# Patient Record
Sex: FEMALE | Race: WHITE | NOT HISPANIC OR LATINO | Employment: OTHER | URBAN - METROPOLITAN AREA
[De-identification: names, ages, dates, MRNs, and addresses within clinical notes are randomized per-mention and may not be internally consistent; named-entity substitution may affect disease eponyms.]

---

## 2018-11-05 ENCOUNTER — DOCUMENTATION (OUTPATIENT)
Dept: OTHER | Facility: HOSPITAL | Age: 73
End: 2018-11-05

## 2018-11-05 NOTE — PROGRESS NOTES
Dr. Rodriguez requesting to present patient in GI Tumor Board on 11/8/2018. I had to google patient's surgeon's name to see where I could the path slides and call the Radiology Department to ask if patient had images. I found both path slides/images to be at [facility]. I got their fax numbers and sent fax cover requests for path slides and discs. I asked that they Fed Ex the items overnight and I gave them numbers/addresses. AG

## 2018-11-09 ENCOUNTER — LAB REQUISITION (OUTPATIENT)
Dept: LAB | Facility: HOSPITAL | Age: 73
End: 2018-11-09

## 2018-11-09 DIAGNOSIS — D3A.020 BENIGN CARCINOID TUMOR OF APPENDIX: ICD-10-CM

## 2018-11-09 LAB
CYTO UR: NORMAL
LAB AP CASE REPORT: NORMAL
LAB AP DIAGNOSIS COMMENT: NORMAL
PATH REPORT.FINAL DX SPEC: NORMAL
PATH REPORT.GROSS SPEC: NORMAL

## 2018-11-09 PROCEDURE — 88321 CONSLTJ&REPRT SLD PREP ELSWR: CPT | Performed by: COLON & RECTAL SURGERY

## 2018-11-20 ENCOUNTER — CONSULT (OUTPATIENT)
Dept: ONCOLOGY | Facility: CLINIC | Age: 73
End: 2018-11-20

## 2018-11-20 VITALS
DIASTOLIC BLOOD PRESSURE: 55 MMHG | RESPIRATION RATE: 16 BRPM | HEIGHT: 63 IN | OXYGEN SATURATION: 97 % | SYSTOLIC BLOOD PRESSURE: 139 MMHG | BODY MASS INDEX: 25.34 KG/M2 | TEMPERATURE: 97.2 F | WEIGHT: 143 LBS | HEART RATE: 60 BPM

## 2018-11-20 DIAGNOSIS — C7A.00 MALIGNANT CARCINOID TUMOR (HCC): ICD-10-CM

## 2018-11-20 DIAGNOSIS — D37.3 LOW GRADE MUCINOUS NEOPLASM OF APPENDIX: ICD-10-CM

## 2018-11-20 DIAGNOSIS — D3A.020 APPENDICEAL CARCINOID TUMOR: Primary | ICD-10-CM

## 2018-11-20 PROCEDURE — 99205 OFFICE O/P NEW HI 60 MIN: CPT | Performed by: INTERNAL MEDICINE

## 2018-11-20 RX ORDER — LISINOPRIL 2.5 MG/1
2.5 TABLET ORAL DAILY
COMMUNITY

## 2018-11-20 RX ORDER — CLONAZEPAM 0.5 MG/1
0.5 TABLET ORAL 2 TIMES DAILY PRN
Refills: 0 | COMMUNITY
Start: 2018-10-10 | End: 2022-07-14

## 2018-11-20 RX ORDER — CITALOPRAM 40 MG/1
40 TABLET ORAL DAILY
COMMUNITY
End: 2019-06-18 | Stop reason: ALTCHOICE

## 2018-11-20 RX ORDER — ONDANSETRON 4 MG/1
4 TABLET, FILM COATED ORAL AS NEEDED
Refills: 0 | COMMUNITY
Start: 2018-10-25

## 2018-11-20 NOTE — PROGRESS NOTES
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 Rodriguez were reviewed and summarized in detail.    Oncology/Hematology History    1. Low-grade mucinous neoplasm of appendix   2. Appendiceal carcinoid     -10/22/18 CT abdomen pelvis [facility] showed small bowel obstruction in the distal ileum with trace ascites.  Laparoscopic resection of appendix showed 4 cm low grade appendiceal mucinous neoplasm well-differentiated with invasion into the muscularis propria into the subserosa focally extending to the serosal surface making this a pathological T4.  Margins were all negative.  No lymphovascular invasion.  Nodes not sampled.  In the same specimen at the base of the appendix was a 5 mm grade 1 well-differentiated carcinoid with the base of the appendix margin positive.  No perineural or lymph vascular invasion.  Subsequently presented to Dr. Dante Rodriguez and then to our multidisciplinary complex GI conference.  The consensus was to scan her thoroughly preferably with a gallium 68 Dotetate scan if possible and then do a right hemicolectomy and then consider adjuvant therapy based on the pathologic staging at that junction.  I saw for the first time on 11/20/18.  Low-grade mucinous neoplasms of the appendix with the potential of this having perforated does have a high risk of peritoneal spread and adjuvant chemotherapy as of dubious benefit.  Appendiceal carcinoids generally are coincidentally found an rarely spread to the liver.  Nonetheless I would like to get gallium-68 Dotetate PET and we'll check her chromogranin A, serotonin, and 24-hour urine 5-HIAA.  Assuming that's negative then I would proceed with a right hemicolectomy given the positive margin at the base of the appendectomy and I would make adjuvant decisions based on what Dr. Rodriguez  found intraoperatively and on pathologic staging.  I will also would add parenthetically that low-grade mucinous carcinomas are generally chemotherapy insensitive and she may prefer to have her surgery done in a place where were they to stumble upon peritoneal involvement of this process that she could do Hipec.  Her daughter lives in [place] and they have the capability at [facility].  With reference to the carcinoid component of this, generally the 5-FU based adjuvant chemotherapy we would give for colon cancer is not helpful for that either but would usually involve tyrosine kinase inhibitors or Temodar and or Avastin but that is not necessarily in an adjuvant setting.        Low grade mucinous neoplasm of appendix    2018 Initial Diagnosis     Low grade mucinous neoplasm of appendix            HISTORY OF PRESENT ILLNESS:  The patient is a 73 y.o.  female, referred for low-grade mucinous neoplasm of the appendix and appendiceal carcinoid.  See above for her oncology history of present illness    REVIEW OF SYSTEMS:  A 14 point review of systems was performed and is negative except as noted above.    History reviewed. No pertinent past medical history.  Past Surgical History:   Procedure Laterality Date   • APPENDECTOMY     •  SECTION     • OOPHORECTOMY Right        Current Outpatient Medications on File Prior to Visit   Medication Sig Dispense Refill   • citalopram (CeleXA) 40 MG tablet Take 40 mg by mouth Daily.     • clonazePAM (KlonoPIN) 0.5 MG tablet Take 0.5 mg by mouth 2 (Two) Times a Day As Needed.  0   • lisinopril (PRINIVIL,ZESTRIL) 2.5 MG tablet Take 2.5 mg by mouth Daily.     • metoprolol tartrate (LOPRESSOR) 25 MG tablet Take 12.5 mg by mouth 2 (Two) Times a Day.  0   • ondansetron (ZOFRAN) 4 MG tablet Take 4 mg by mouth As Needed.  0     No current facility-administered medications on file prior to visit.        Allergies   Allergen Reactions   • Cefuroxime Rash   • Sulfa Antibiotics  "Rash       Social History     Socioeconomic History   • Marital status: Unknown     Spouse name: Not on file   • Number of children: Not on file   • Years of education: Not on file   • Highest education level: Not on file   Tobacco Use   • Smoking status: Never Smoker   • Smokeless tobacco: Never Used       Family History   Problem Relation Age of Onset   • Hypertension Mother    • Heart attack Father    • Lung cancer Father    • Cancer Father        PHYSICAL EXAM:    /55   Pulse 60   Temp 97.2 °F (36.2 °C)   Resp 16   Ht 158.8 cm (62.5\")   Wt 64.9 kg (143 lb)   SpO2 97%   BMI 25.74 kg/m²     ECOG: (0) Fully active, able to carry on all predisease performance without restriction  General: well appearing female in no acute distress  HEENT: sclera anicteric, oropharynx clear  Lymphatics: no cervical, supraclavicular, inguinal, or axillary adenopathy  Cardiovascular: regular rate and rhythm, no murmurs  Neck: Supple; No thyromegaly  Lungs: clear to auscultation bilaterally. No respiratory distress.   Abdomen: soft, nontender, nondistended.  No palpable organomegaly  Extremities: no cyanosis, clubbing, edema, or cords  Skin: no rashes, lesions, bruising, or petechiae  Neuro: Alert and oriented x 4; Moving all extremities.  Psych: No anxiety or depression      Assessment/Plan     1. Low-grade mucinous neoplasm of appendix   2. Appendiceal carcinoid     -10/22/18 CT abdomen pelvis [facility] showed small bowel obstruction in the distal ileum with trace ascites.  Laparoscopic resection of appendix showed 4 cm low grade appendiceal mucinous neoplasm well-differentiated with invasion into the muscularis propria into the subserosa focally extending to the serosal surface making this a pathological T4.  Margins were all negative.  No lymphovascular invasion.  Nodes not sampled.  In the same specimen at the base of the appendix was a 5 mm grade 1 well-differentiated carcinoid with the base of " the appendix margin positive.  No perineural or lymph vascular invasion.  Subsequently presented to Dr. Dante Rodriguez and then to our multidisciplinary complex GI conference.  The consensus was to scan her thoroughly preferably with a gallium 68 Dotetate scan if possible and then do a right hemicolectomy and then consider adjuvant therapy based on the pathologic staging at that junction.  I saw for the first time on 11/20/18.  Low-grade mucinous neoplasms of the appendix with the potential of this having perforated does have a high risk of peritoneal spread and adjuvant chemotherapy as of dubious benefit.  Appendiceal carcinoids generally are coincidentally found an rarely spread to the liver.  Nonetheless I would like to get gallium-68 Dotetate PET and we'll check her chromogranin A, serotonin, and 24-hour urine 5-HIAA.  Assuming that's negative then I would proceed with a right hemicolectomy given the positive margin at the base of the appendectomy and I would make adjuvant decisions based on what Dr. Rodriguez found intraoperatively and on pathologic staging.  I will also would add parenthetically that low-grade mucinous carcinomas are generally chemotherapy insensitive and she may prefer to have her surgery done in a place where were they to stumble upon peritoneal involvement of this process that she could do Hipec.  Her daughter lives in [place] and they have the capability at [facility].  With reference to the carcinoid component of this, generally the 5-FU based adjuvant chemotherapy we would give for colon cancer is not helpful for that either but would usually involve tyrosine kinase inhibitors or Temodar and or Avastin but that is not necessarily in an adjuvant setting.  Discussed with patient for over an hour greater than 50% spent counseling.    Matheus Bryan MD    11/20/2018

## 2018-11-27 ENCOUNTER — HOSPITAL ENCOUNTER (OUTPATIENT)
Dept: PET IMAGING | Facility: HOSPITAL | Age: 73
Discharge: HOME | End: 2018-11-27
Attending: INTERNAL MEDICINE | Admitting: INTERNAL MEDICINE

## 2018-11-27 ENCOUNTER — HOSPITAL ENCOUNTER (OUTPATIENT)
Dept: PET IMAGING | Facility: HOSPITAL | Age: 73
Discharge: HOME | End: 2018-11-27
Attending: INTERNAL MEDICINE

## 2018-11-27 ENCOUNTER — LAB (OUTPATIENT)
Dept: LAB | Facility: HOSPITAL | Age: 73
End: 2018-11-27

## 2018-11-27 DIAGNOSIS — D3A.020 APPENDICEAL CARCINOID TUMOR: ICD-10-CM

## 2018-11-27 DIAGNOSIS — D3A.020 APPENDICEAL CARCINOID TUMOR: Primary | ICD-10-CM

## 2018-11-27 DIAGNOSIS — D37.3 LOW GRADE MUCINOUS NEOPLASM OF APPENDIX: ICD-10-CM

## 2018-11-27 DIAGNOSIS — C7A.020 MALIGNANT CARCINOID TUMOR OF APPENDIX (HCC): ICD-10-CM

## 2018-11-27 DIAGNOSIS — C7A.00 MALIGNANT CARCINOID TUMOR (HCC): ICD-10-CM

## 2018-11-27 LAB
ALBUMIN SERPL-MCNC: 4.57 G/DL (ref 3.2–4.8)
ALBUMIN/GLOB SERPL: 2 G/DL (ref 1.5–2.5)
ALP SERPL-CCNC: 55 U/L (ref 25–100)
ALT SERPL W P-5'-P-CCNC: 40 U/L (ref 7–40)
ANION GAP SERPL CALCULATED.3IONS-SCNC: 6 MMOL/L (ref 3–11)
AST SERPL-CCNC: 38 U/L (ref 0–33)
BASOPHILS # BLD AUTO: 0.03 10*3/MM3 (ref 0–0.2)
BASOPHILS NFR BLD AUTO: 0.5 % (ref 0–1)
BILIRUB SERPL-MCNC: 1.2 MG/DL (ref 0.3–1.2)
BUN BLD-MCNC: 15 MG/DL (ref 9–23)
BUN/CREAT SERPL: 20 (ref 7–25)
CALCIUM SPEC-SCNC: 9.3 MG/DL (ref 8.7–10.4)
CEA SERPL-MCNC: 1.9 NG/ML (ref 0–2.5)
CHLORIDE SERPL-SCNC: 100 MMOL/L (ref 99–109)
CO2 SERPL-SCNC: 29 MMOL/L (ref 20–31)
CREAT BLD-MCNC: 0.75 MG/DL (ref 0.6–1.3)
DEPRECATED RDW RBC AUTO: 45.3 FL (ref 37–54)
EOSINOPHIL # BLD AUTO: 0.35 10*3/MM3 (ref 0–0.3)
EOSINOPHIL NFR BLD AUTO: 5.3 % (ref 0–3)
ERYTHROCYTE [DISTWIDTH] IN BLOOD BY AUTOMATED COUNT: 13 % (ref 11.3–14.5)
GFR SERPL CREATININE-BSD FRML MDRD: 76 ML/MIN/1.73
GLOBULIN UR ELPH-MCNC: 2.3 GM/DL
GLUCOSE BLD-MCNC: 83 MG/DL (ref 70–100)
GLUCOSE BLDC GLUCOMTR-MCNC: 91 MG/DL (ref 70–130)
HCT VFR BLD AUTO: 41.7 % (ref 34.5–44)
HGB BLD-MCNC: 13.4 G/DL (ref 11.5–15.5)
IMM GRANULOCYTES # BLD: 0.03 10*3/MM3 (ref 0–0.03)
IMM GRANULOCYTES NFR BLD: 0.5 % (ref 0–0.6)
LYMPHOCYTES # BLD AUTO: 2.44 10*3/MM3 (ref 0.6–4.8)
LYMPHOCYTES NFR BLD AUTO: 37.1 % (ref 24–44)
MCH RBC QN AUTO: 30.5 PG (ref 27–31)
MCHC RBC AUTO-ENTMCNC: 32.1 G/DL (ref 32–36)
MCV RBC AUTO: 94.8 FL (ref 80–99)
MONOCYTES # BLD AUTO: 0.44 10*3/MM3 (ref 0–1)
MONOCYTES NFR BLD AUTO: 6.7 % (ref 0–12)
NEUTROPHILS # BLD AUTO: 3.31 10*3/MM3 (ref 1.5–8.3)
NEUTROPHILS NFR BLD AUTO: 50.4 % (ref 41–71)
PLATELET # BLD AUTO: 266 10*3/MM3 (ref 150–450)
PMV BLD AUTO: 11.1 FL (ref 6–12)
POTASSIUM BLD-SCNC: 4.6 MMOL/L (ref 3.5–5.5)
PROT SERPL-MCNC: 6.9 G/DL (ref 5.7–8.2)
RBC # BLD AUTO: 4.4 10*6/MM3 (ref 3.89–5.14)
SODIUM BLD-SCNC: 135 MMOL/L (ref 132–146)
WBC NRBC COR # BLD: 6.57 10*3/MM3 (ref 3.5–10.8)

## 2018-11-27 PROCEDURE — 82962 GLUCOSE BLOOD TEST: CPT

## 2018-11-27 PROCEDURE — A9552 F18 FDG: HCPCS | Performed by: INTERNAL MEDICINE

## 2018-11-27 PROCEDURE — 36415 COLL VENOUS BLD VENIPUNCTURE: CPT

## 2018-11-27 PROCEDURE — 84260 ASSAY OF SEROTONIN: CPT

## 2018-11-27 PROCEDURE — 0 FLUDEOXYGLUCOSE F18 SOLUTION: Performed by: INTERNAL MEDICINE

## 2018-11-27 PROCEDURE — 86316 IMMUNOASSAY TUMOR OTHER: CPT

## 2018-11-27 PROCEDURE — 80053 COMPREHEN METABOLIC PANEL: CPT

## 2018-11-27 PROCEDURE — 83497 ASSAY OF 5-HIAA: CPT

## 2018-11-27 PROCEDURE — 81050 URINALYSIS VOLUME MEASURE: CPT

## 2018-11-27 PROCEDURE — 78815 PET IMAGE W/CT SKULL-THIGH: CPT

## 2018-11-27 PROCEDURE — 82378 CARCINOEMBRYONIC ANTIGEN: CPT

## 2018-11-27 PROCEDURE — 85025 COMPLETE CBC W/AUTO DIFF WBC: CPT

## 2018-11-27 RX ADMIN — FLUDEOXYGLUCOSE F18 1 DOSE: 300 INJECTION INTRAVENOUS at 12:59

## 2018-11-29 ENCOUNTER — TELEPHONE (OUTPATIENT)
Dept: ONCOLOGY | Facility: CLINIC | Age: 73
End: 2018-11-29

## 2018-11-29 LAB — CGA SERPL-SCNC: <1 NMOL/L (ref 0–5)

## 2018-11-29 NOTE — TELEPHONE ENCOUNTER
PET results dicussed and explained why a regular PET doesn't give us the answers we need.  Patient had additional questions regarding the prep for the [employer] PET.  Discussed with Dameon ALDRIDGE  She will call her later today.  Patient notified.

## 2018-11-30 ENCOUNTER — TELEPHONE (OUTPATIENT)
Dept: ONCOLOGY | Facility: CLINIC | Age: 73
End: 2018-11-30

## 2018-11-30 NOTE — TELEPHONE ENCOUNTER
----- Message from Dameon Carlson sent at 11/30/2018  1:41 PM EST -----  Regarding: RE: TERRENCE - MARGARET   Contact: [phone]   Will r/s colonscopy  ----- Message -----  From: Una Escalante RN  Sent: 11/30/2018  11:09 AM  To: Dameon Carlson  Subject: FW: TERRENCE - QUESTIONS                                ----- Message -----  From: Sarina Luque  Sent: 11/30/2018  10:45 AM  To: e Onc [facility] Nurse [place]  Subject: TERRENCE - QUESTIONS                                PATIENT CALLED AND SAID TERRENCE ORDERED PET SCAN ON DEC 4, 2018. DR NATHAN WANTS TO DO A COLONOSCOPY THAT MORNING.     SHE IS WANTING TO KNOW IF THE PREP WILL INTERFERE WITH THE PET SCAN? OR SHOULD SHE RESCHEDULE COLONOSCOPY?

## 2018-12-01 LAB
5OH-INDOLEACETATE 24H UR-MCNC: 2.4 MG/L
5OH-INDOLEACETATE 24H UR-MRATE: 2.5 MG/24 HR (ref 0–14.9)
SEROTONIN PLAS-MCNC: 7 NG/ML (ref 0–420)

## 2018-12-04 ENCOUNTER — HOSPITAL ENCOUNTER (OUTPATIENT)
Dept: PET IMAGING | Facility: HOSPITAL | Age: 73
Discharge: HOME | End: 2018-12-04
Attending: INTERNAL MEDICINE

## 2018-12-04 ENCOUNTER — HOSPITAL ENCOUNTER (OUTPATIENT)
Dept: PET IMAGING | Facility: HOSPITAL | Age: 73
Discharge: HOME | End: 2018-12-04
Attending: INTERNAL MEDICINE | Admitting: INTERNAL MEDICINE

## 2018-12-04 DIAGNOSIS — C7A.020 MALIGNANT CARCINOID TUMOR OF APPENDIX (HCC): ICD-10-CM

## 2018-12-04 DIAGNOSIS — D3A.020 APPENDICEAL CARCINOID TUMOR: ICD-10-CM

## 2018-12-04 PROCEDURE — A9587 GALLIUM GA-68: HCPCS | Performed by: INTERNAL MEDICINE

## 2018-12-04 PROCEDURE — 0 GALLIUM GA 68 DOTATATE KIT: Performed by: INTERNAL MEDICINE

## 2018-12-04 PROCEDURE — 78815 PET IMAGE W/CT SKULL-THIGH: CPT

## 2018-12-04 RX ADMIN — 68GA-DOTATATE 1.06 EACH: KIT INTRAVENOUS at 15:50

## 2018-12-07 ENCOUNTER — OFFICE VISIT (OUTPATIENT)
Dept: ONCOLOGY | Facility: CLINIC | Age: 73
End: 2018-12-07

## 2018-12-07 VITALS
HEIGHT: 63 IN | SYSTOLIC BLOOD PRESSURE: 141 MMHG | TEMPERATURE: 98.4 F | RESPIRATION RATE: 16 BRPM | BODY MASS INDEX: 25.69 KG/M2 | HEART RATE: 59 BPM | WEIGHT: 145 LBS | DIASTOLIC BLOOD PRESSURE: 59 MMHG

## 2018-12-07 DIAGNOSIS — D37.3 LOW GRADE MUCINOUS NEOPLASM OF APPENDIX: Primary | ICD-10-CM

## 2018-12-07 DIAGNOSIS — D3A.020 APPENDICEAL CARCINOID TUMOR: ICD-10-CM

## 2018-12-07 PROCEDURE — 99214 OFFICE O/P EST MOD 30 MIN: CPT | Performed by: INTERNAL MEDICINE

## 2018-12-07 NOTE — PROGRESS NOTES
CHIEF COMPLAINT: Follow-up low-grade mucinous neoplasm of appendix as well as appendiceal carcinoid    Problem List:  Oncology/Hematology History    1. Low-grade mucinous neoplasm of appendix   2. Appendiceal carcinoid     -10/22/18 CT abdomen pelvis [facility] showed small bowel obstruction in the distal ileum with trace ascites.  Laparoscopic resection of appendix showed 4 cm low grade appendiceal mucinous neoplasm well-differentiated with invasion into the muscularis propria into the subserosa focally extending to the serosal surface making this a pathological T4.  Margins were all negative.  No lymphovascular invasion.  Nodes not sampled.  In the same specimen at the base of the appendix was a 5 mm grade 1 well-differentiated carcinoid with the base of the appendix margin positive.  No perineural or lymph vascular invasion.  Subsequently presented to Dr. Dante Rodriguez and then to our multidisciplinary complex GI conference.  The consensus was to scan her thoroughly preferably with a gallium 68 Dotetate scan if possible and then do a right hemicolectomy and then consider adjuvant therapy based on the pathologic staging at that junction.  I saw for the first time on 11/20/18.  Low-grade mucinous neoplasms of the appendix with the potential of this having perforated does have a high risk of peritoneal spread and adjuvant chemotherapy as of dubious benefit.  Appendiceal carcinoids generally are coincidentally found an rarely spread to the liver.  Nonetheless I would like to get gallium-68 Dotetate PET and we'll check her chromogranin A, serotonin, and 24-hour urine 5-HIAA.  Assuming that's negative then I would proceed with a right hemicolectomy given the positive margin at the base of the appendectomy and I would make adjuvant decisions based on what Dr. Rodriguez found intraoperatively and on pathologic staging.  I will also would add parenthetically that low-grade mucinous carcinomas are  generally chemotherapy insensitive and she may prefer to have her surgery done in a place where were they to stumble upon peritoneal involvement of this process that she could do Hipec.  Her daughter lives in [place] and they have the capability at [facility].  With reference to the carcinoid component of this, generally the 5-FU based adjuvant chemotherapy we would give for colon cancer is not helpful for that either but would usually involve tyrosine kinase inhibitors or Temodar and or Avastin but that is not necessarily in an adjuvant setting.  -18 gallium-68 Dotetate as well as FDG PET both performed within the last couple of weeks showing no metastasis.        Low grade mucinous neoplasm of appendix    2018 Initial Diagnosis     Low grade mucinous neoplasm of appendix            HISTORY OF PRESENT ILLNESS:  The patient is a 73 y.o. female, here for follow up on management of low-grade mucinous carcinoma as well as appendiceal carcinoid.  No evidence of metastasis on standard pet or on gallium-68 Dotetate PET.  No serologic hint of metastasis.      History reviewed. No pertinent past medical history.  Past Surgical History:   Procedure Laterality Date   • APPENDECTOMY     •  SECTION     • OOPHORECTOMY Right        Allergies   Allergen Reactions   • Cefuroxime Rash   • Sulfa Antibiotics Rash       Family History and Social History reviewed and changed as necessary      REVIEW OF SYSTEM:   Review of Systems   Constitutional: Negative for appetite change, chills, diaphoresis, fatigue, fever and unexpected weight change.   HENT:   Negative for mouth sores, sore throat and trouble swallowing.    Eyes: Negative for icterus.   Respiratory: Negative for cough, hemoptysis and shortness of breath.    Cardiovascular: Negative for chest pain, leg swelling and palpitations.   Gastrointestinal: Negative for abdominal distention, abdominal pain, blood in stool, constipation, diarrhea, nausea and  "vomiting.   Endocrine: Negative for hot flashes.   Genitourinary: Negative for bladder incontinence, difficulty urinating, dysuria, frequency and hematuria.    Musculoskeletal: Negative for gait problem, neck pain and neck stiffness.   Skin: Negative for rash.   Neurological: Negative for dizziness, gait problem, headaches, light-headedness and numbness.   Hematological: Negative for adenopathy. Does not bruise/bleed easily.   Psychiatric/Behavioral: Negative for depression. The patient is not nervous/anxious.    All other systems reviewed and are negative.       PHYSICAL EXAM    Vitals:    12/07/18 1341   BP: 141/59   Pulse: 59   Resp: 16   Temp: 98.4 °F (36.9 °C)   Weight: 65.8 kg (145 lb)   Height: 158.8 cm (62.5\")     Constitutional: Appears well-developed and well-nourished. No distress.   ECOG: (0) Fully active, able to carry on all predisease performance without restriction  HENT:   Head: Normocephalic.   Mouth/Throat: Oropharynx is clear and moist.   Eyes: Conjunctivae are normal. Pupils are equal, round, and reactive to light. No scleral icterus.   Neck: Neck supple. No JVD present. No thyromegaly present.   Cardiovascular: Normal rate, regular rhythm and normal heart sounds.    Pulmonary/Chest: Breath sounds normal. No respiratory distress.   Abdominal: Soft. Exhibits no distension and no mass. There is no hepatosplenomegaly. There is no tenderness. There is no rebound and no guarding.   Musculoskeletal:Exhibits no edema, tenderness or deformity.   Neurological: Alert and oriented to person, place, and time. Exhibits normal muscle tone.   Skin: No ecchymosis, no petechiae and no rash noted. Not diaphoretic. No cyanosis. Nails show no clubbing.   Psychiatric: Normal mood and affect.   Vitals reviewed.      Nm Pet Skull Base To Mid Thigh    Result Date: 12/5/2018  Negative Gallium Dotatate scan. No evidence of residual or metastatic neuroendocrine tumor. Gallstones incidentally noted.  D:  12/04/2018 E:  " 12/05/2018     This report was finalized on 12/5/2018 9:04 PM by DR. Osorio Chin MD.              ASSESSMENT & PLAN:    1.  Low-grade mucinous carcinoma  2. Appendiceal carcinoid    Discussion: no hint of mesenteric soilage or distant metastasis on gallium-68 Dotetate or on 24-hour urine 5 HIAA or chromogranin A.  Have discussed the case thoroughly with Dr. Rodriguez as well as by phone with Alexander Hunter at the [facility].  In the event that mesenteric soilage has occurred, which I doubt will be the case, I would want her in a situation where she could be treated with Hipec, and Dr. Hunter is intermittently familiar with that.  I will see her back in the new year to see what they have found and what their decisions are.  Discussed with patient 30 minutes greater than 50% spent counseling    Matheus Bryan MD    12/07/2018

## 2019-03-07 ENCOUNTER — LAB (OUTPATIENT)
Dept: LAB | Facility: HOSPITAL | Age: 74
End: 2019-03-07

## 2019-03-07 ENCOUNTER — OFFICE VISIT (OUTPATIENT)
Dept: ONCOLOGY | Facility: CLINIC | Age: 74
End: 2019-03-07

## 2019-03-07 VITALS
HEART RATE: 60 BPM | WEIGHT: 127 LBS | SYSTOLIC BLOOD PRESSURE: 153 MMHG | DIASTOLIC BLOOD PRESSURE: 67 MMHG | RESPIRATION RATE: 16 BRPM | TEMPERATURE: 97.1 F | BODY MASS INDEX: 22.5 KG/M2 | HEIGHT: 63 IN | OXYGEN SATURATION: 98 %

## 2019-03-07 DIAGNOSIS — D37.3 LOW GRADE MUCINOUS NEOPLASM OF APPENDIX: Primary | Chronic | ICD-10-CM

## 2019-03-07 DIAGNOSIS — D3A.020 APPENDICEAL CARCINOID TUMOR: ICD-10-CM

## 2019-03-07 DIAGNOSIS — D37.3 LOW GRADE MUCINOUS NEOPLASM OF APPENDIX: ICD-10-CM

## 2019-03-07 DIAGNOSIS — C7B.00 SECONDARY CARCINOID TUMOR (HCC): ICD-10-CM

## 2019-03-07 LAB
ALBUMIN SERPL-MCNC: 4.76 G/DL (ref 3.2–4.8)
ALBUMIN/GLOB SERPL: 2.1 G/DL (ref 1.5–2.5)
ALP SERPL-CCNC: 63 U/L (ref 25–100)
ALT SERPL W P-5'-P-CCNC: 20 U/L (ref 7–40)
ANION GAP SERPL CALCULATED.3IONS-SCNC: 9 MMOL/L (ref 3–11)
AST SERPL-CCNC: 29 U/L (ref 0–33)
BILIRUB SERPL-MCNC: 1.4 MG/DL (ref 0.3–1.2)
BUN BLD-MCNC: 16 MG/DL (ref 9–23)
BUN/CREAT SERPL: 20.8 (ref 7–25)
CALCIUM SPEC-SCNC: 9.6 MG/DL (ref 8.7–10.4)
CHLORIDE SERPL-SCNC: 101 MMOL/L (ref 99–109)
CO2 SERPL-SCNC: 31 MMOL/L (ref 20–31)
CREAT BLD-MCNC: 0.77 MG/DL (ref 0.6–1.3)
ERYTHROCYTE [DISTWIDTH] IN BLOOD BY AUTOMATED COUNT: 13.2 % (ref 11.3–14.5)
GFR SERPL CREATININE-BSD FRML MDRD: 74 ML/MIN/1.73
GLOBULIN UR ELPH-MCNC: 2.2 GM/DL
GLUCOSE BLD-MCNC: 112 MG/DL (ref 70–100)
HCT VFR BLD AUTO: 36 % (ref 34.5–44)
HGB BLD-MCNC: 12.1 G/DL (ref 11.5–15.5)
LYMPHOCYTES # BLD AUTO: 2.4 10*3/MM3 (ref 0.6–4.8)
LYMPHOCYTES NFR BLD AUTO: 29.4 % (ref 24–44)
MCH RBC QN AUTO: 31 PG (ref 27–31)
MCHC RBC AUTO-ENTMCNC: 33.5 G/DL (ref 32–36)
MCV RBC AUTO: 92.6 FL (ref 80–99)
MONOCYTES # BLD AUTO: 0.4 10*3/MM3 (ref 0–1)
MONOCYTES NFR BLD AUTO: 4.3 % (ref 0–12)
NEUTROPHILS # BLD AUTO: 5.4 10*3/MM3 (ref 1.5–8.3)
NEUTROPHILS NFR BLD AUTO: 66.3 % (ref 41–71)
PLATELET # BLD AUTO: 282 10*3/MM3 (ref 150–450)
PMV BLD AUTO: 8.4 FL (ref 6–12)
POTASSIUM BLD-SCNC: 3.4 MMOL/L (ref 3.5–5.5)
PROT SERPL-MCNC: 7 G/DL (ref 5.7–8.2)
RBC # BLD AUTO: 3.89 10*6/MM3 (ref 3.89–5.14)
SODIUM BLD-SCNC: 141 MMOL/L (ref 132–146)
WBC NRBC COR # BLD: 8.2 10*3/MM3 (ref 3.5–10.8)

## 2019-03-07 PROCEDURE — 84260 ASSAY OF SEROTONIN: CPT

## 2019-03-07 PROCEDURE — 85025 COMPLETE CBC W/AUTO DIFF WBC: CPT

## 2019-03-07 PROCEDURE — 80053 COMPREHEN METABOLIC PANEL: CPT

## 2019-03-07 PROCEDURE — 86316 IMMUNOASSAY TUMOR OTHER: CPT

## 2019-03-07 PROCEDURE — 36415 COLL VENOUS BLD VENIPUNCTURE: CPT

## 2019-03-07 PROCEDURE — 99215 OFFICE O/P EST HI 40 MIN: CPT | Performed by: INTERNAL MEDICINE

## 2019-03-07 NOTE — PROGRESS NOTES
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    Problem List:  Oncology/Hematology History    1. 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 0.5 cm T2 N0 M0 invading the muscularis propria stage I appendiceal goblet cell carcinoid     -10/22/18 CT abdomen pelvis [facility] showed small bowel obstruction in the distal ileum with trace ascites.  Laparoscopic resection of appendix showed 4 cm low grade appendiceal mucinous neoplasm well-differentiated with invasion into the muscularis propria into the subserosa focally extending to the serosal surface making this a pathological T4.  Margins were all negative.  No lymphovascular invasion.  Nodes not sampled.  In the same specimen at the base of the appendix was a 5 mm grade 1 well-differentiated carcinoid with the base of the appendix margin positive.  No perineural or lymph vascular invasion.  Subsequently presented to Dr. Dante Rodriguez and then to our multidisciplinary complex GI conference.  The consensus was to scan her thoroughly preferably with a gallium 68 Dotetate scan if possible and then do a right hemicolectomy and then consider adjuvant therapy based on the pathologic staging at that junction.  I saw for the first time on 11/20/18.  Low-grade mucinous neoplasms of the appendix with the potential of this having perforated does have a high risk of peritoneal spread and adjuvant chemotherapy as of dubious benefit.  Appendiceal carcinoids generally are coincidentally found an rarely spread to the liver.  Nonetheless I would like to get gallium-68 Dotetate PET and we'll check her chromogranin A, serotonin, and 24-hour urine 5-HIAA.  Assuming that's negative then I would proceed with a right hemicolectomy given the positive margin at the base of the appendectomy and I would make adjuvant decisions based on what Dr. Rodriguez found  intraoperatively and on pathologic staging.  I will also would add parenthetically that low-grade mucinous carcinomas are generally chemotherapy insensitive and she may prefer to have her surgery done in a place where were they to stumble upon peritoneal involvement of this process that she could do Hipec.  Her daughter lives in [place] and they have the capability at [facility].  With reference to the carcinoid component of this, generally the 5-FU based adjuvant chemotherapy we would give for colon cancer is not helpful for that either but would usually involve tyrosine kinase inhibitors or Temodar and or Avastin but that is not necessarily in an adjuvant setting.  Hence, based on the potential complexity of surgery she was subsequently sent to Dr. Hunter at [facility] who has familiarity with Hipec.    -11/27/2018 PET negative    -12/5/2018 gallium-68 Dotetate PET negative    -12/17/2018 colonoscopy showed hyperplastic polyp 12 mm in the sigmoid colon    -12/18/2018 robotic right hemicolectomy with small bowel resection by Dr. Hunter was performed.  Pathology showed no residual goblet cell carcinoid.  Margins negative for malignancy and no disease in 15 lymph nodes sampled.  Perioperative CT scans showed no distant metastases according to Dr. Hunter's note.    -2/12/2019: Followed up with Dr. Hunter.  He recommended follow-up surveillance with me and to see if survivorship at .     -3/7/2019 office visit: Saw me for the first time postoperatively.  Reviewed the above data with the patient.  While with the goblet cell carcinoid there is a higher risk of recurrence, there is no adjuvant data to suggest treatment at this junction.  She is now nearly 3 months out postoperatively. There is no indication for postoperative treatment. NCCN guidelines suggests biochemical markers and scanning only as clinically indicated with tumors less than 2 cm and this was a 0.5 mm tumor.  For the low-grade mucinous neoplasm of the  appendix, no routine serologic or scanning is recommended in the absence of symptoms.  There was no hint of peritoneal involvement according to Dr. Hunter.  Nonetheless, she continues to lose weight with a poor appetite and there is not been a postoperative scan done yet so I will established at baseline now and get her to my nutritionists to work with her diet and have her see my nurse practitioner back in a few weeks to make sure her weight has stabilized and that the scan showed no sinister evidence of metastasis.  The weight loss is almost assuredly decreased intake trying to deal with her postoperative diarrhea.  Though I would not routinely do chromogranin A, serotonin, and urine 5-HIAA, with her diarrhea I will check that one time though I think that this is almost assuredly postoperative in nature.            Low grade mucinous neoplasm of appendix    2018 Initial Diagnosis     Low grade mucinous neoplasm of appendix            HISTORY OF PRESENT ILLNESS:  The patient is a 73 y.o. female, here for follow up on management of low-grade mucinous neoplasm of the appendix as well as goblet cell carcinoid.  She is having diarrhea and weight loss postoperatively      History reviewed. No pertinent past medical history.  Past Surgical History:   Procedure Laterality Date   • APPENDECTOMY     •  SECTION     • OOPHORECTOMY Right        Allergies   Allergen Reactions   • Cefuroxime Rash   • Sulfa Antibiotics Rash       Family History and Social History reviewed and changed as necessary      REVIEW OF SYSTEM:   Review of Systems   Constitutional: Negative for appetite change, chills, diaphoresis, fatigue, fever and unexpected weight change.   HENT:   Negative for mouth sores, sore throat and trouble swallowing.    Eyes: Negative for icterus.   Respiratory: Negative for cough, hemoptysis and shortness of breath.    Cardiovascular: Negative for chest pain, leg swelling and palpitations.   Gastrointestinal:  "Negative for abdominal distention, abdominal pain, blood in stool, constipation, diarrhea, nausea and vomiting.   Endocrine: Negative for hot flashes.   Genitourinary: Negative for bladder incontinence, difficulty urinating, dysuria, frequency and hematuria.    Musculoskeletal: Negative for gait problem, neck pain and neck stiffness.   Skin: Negative for rash.   Neurological: Negative for dizziness, gait problem, headaches, light-headedness and numbness.   Hematological: Negative for adenopathy. Does not bruise/bleed easily.   Psychiatric/Behavioral: Negative for depression. The patient is not nervous/anxious.    All other systems reviewed and are negative.       PHYSICAL EXAM    Vitals:    03/07/19 1118   BP: 153/67   Pulse: 60   Resp: 16   Temp: 97.1 °F (36.2 °C)   TempSrc: Temporal   SpO2: 98%   Weight: 57.6 kg (127 lb)   Height: 158.8 cm (62.5\")     Constitutional: Appears well-developed and well-nourished. No distress.   ECOG: (2) Ambulatory and capable of self care, unable to carry out work activity, up and about > 50% or waking hours  HENT:   Head: Normocephalic.   Mouth/Throat: Oropharynx is clear and moist.   Eyes: Conjunctivae are normal. Pupils are equal, round, and reactive to light. No scleral icterus.   Neck: Neck supple. No JVD present. No thyromegaly present.   Cardiovascular: Normal rate, regular rhythm and normal heart sounds.    Pulmonary/Chest: Breath sounds normal. No respiratory distress.   Abdominal: Soft. Exhibits no distension and no mass. There is no hepatosplenomegaly. There is no tenderness. There is no rebound and no guarding.   Musculoskeletal:Exhibits no edema, tenderness or deformity.   Neurological: Alert and oriented to person, place, and time. Exhibits normal muscle tone.   Skin: No ecchymosis, no petechiae and no rash noted. Not diaphoretic. No cyanosis. Nails show no clubbing.   Psychiatric: Normal mood and affect.   Vitals reviewed.      Lab Results   Component Value Date    HGB " 13.4 11/27/2018    HCT 41.7 11/27/2018    MCV 94.8 11/27/2018     11/27/2018    WBC 6.57 11/27/2018    NEUTROABS 3.31 11/27/2018    LYMPHSABS 2.44 11/27/2018    MONOSABS 0.44 11/27/2018    EOSABS 0.35 (H) 11/27/2018    BASOSABS 0.03 11/27/2018       Lab Results   Component Value Date    GLUCOSE 83 11/27/2018    BUN 15 11/27/2018    CREATININE 0.75 11/27/2018     11/27/2018    K 4.6 11/27/2018     11/27/2018    CO2 29.0 11/27/2018    CALCIUM 9.3 11/27/2018    PROTEINTOT 6.9 11/27/2018    ALBUMIN 4.57 11/27/2018    BILITOT 1.2 11/27/2018    ALKPHOS 55 11/27/2018    AST 38 (H) 11/27/2018    ALT 40 11/27/2018                   ASSESSMENT & PLAN:    3. 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 0.5 cm T2 N0 M0 invading the muscularis propria stage I appendiceal goblet cell carcinoid     -10/22/18 CT abdomen pelvis [facility] showed small bowel obstruction in the distal ileum with trace ascites.  Laparoscopic resection of appendix showed 4 cm low grade appendiceal mucinous neoplasm well-differentiated with invasion into the muscularis propria into the subserosa focally extending to the serosal surface making this a pathological T4.  Margins were all negative.  No lymphovascular invasion.  Nodes not sampled.  In the same specimen at the base of the appendix was a 5 mm grade 1 well-differentiated carcinoid with the base of the appendix margin positive.  No perineural or lymph vascular invasion.  Subsequently presented to Dr. Dante Rodriguez and then to our multidisciplinary complex GI conference.  The consensus was to scan her thoroughly preferably with a gallium 68 Dotetate scan if possible and then do a right hemicolectomy and then consider adjuvant therapy based on the pathologic staging at that junction.  I saw for the first time on 11/20/18.  Low-grade mucinous neoplasms of the appendix with the potential of this having perforated does have a high risk of  peritoneal spread and adjuvant chemotherapy as of dubious benefit.  Appendiceal carcinoids generally are coincidentally found an rarely spread to the liver.  Nonetheless I would like to get gallium-68 Dotetate PET and we'll check her chromogranin A, serotonin, and 24-hour urine 5-HIAA.  Assuming that's negative then I would proceed with a right hemicolectomy given the positive margin at the base of the appendectomy and I would make adjuvant decisions based on what Dr. Rodriguez found intraoperatively and on pathologic staging.  I will also would add parenthetically that low-grade mucinous carcinomas are generally chemotherapy insensitive and she may prefer to have her surgery done in a place where were they to stumble upon peritoneal involvement of this process that she could do Hipec.  Her daughter lives in [place] and they have the capability at [facility].  With reference to the carcinoid component of this, generally the 5-FU based adjuvant chemotherapy we would give for colon cancer is not helpful for that either but would usually involve tyrosine kinase inhibitors or Temodar and or Avastin but that is not necessarily in an adjuvant setting.  Hence, based on the potential complexity of surgery she was subsequently sent to Dr. Hunter at [facility] who has familiarity with Hipec.    -11/27/2018 PET negative    -12/5/2018 gallium-68 Dotetate PET negative    -12/17/2018 colonoscopy showed hyperplastic polyp 12 mm in the sigmoid colon    -12/18/2018 robotic right hemicolectomy with small bowel resection by Dr. Hunter was performed.  Pathology showed no residual goblet cell carcinoid.  Margins negative for malignancy and no disease in 15 lymph nodes sampled.  Perioperative CT scans showed no distant metastases according to Dr. Hunter's note.    -2/12/2019: Followed up with Dr. Hunter.  He recommended follow-up surveillance with me and to see if survivorship at .     -3/7/2019 office visit: Saw me for the first time  postoperatively.  Reviewed the above data with the patient.  While with the goblet cell carcinoid there is a higher risk of recurrence, there is no adjuvant data to suggest treatment at this junction.  She is now nearly 3 months out postoperatively. There is no indication for postoperative treatment. NCCN guidelines suggests biochemical markers and scanning only as clinically indicated with tumors less than 2 cm and this was a 0.5 mm tumor.  For the low-grade mucinous neoplasm of the appendix, no routine serologic or scanning is recommended in the absence of symptoms.  There was no hint of peritoneal involvement according to Dr. Hunter.  Nonetheless, she continues to lose weight with a poor appetite and there is not been a postoperative scan done yet so I will established at baseline now and get her to my nutritionists to work with her diet and have her see my nurse practitioner back in a few weeks to make sure her weight has stabilized and that the scan showed no sinister evidence of metastasis.  The weight loss is almost assuredly decreased intake trying to deal with her postoperative diarrhea.  Though I would not routinely do chromogranin A, serotonin, and urine 5-HIAA, with her diarrhea I will check that one time though I think that this is almost assuredly postoperative in nature.  If all of this is negative then I would just see her quarterly with my nurse practitioner to make sure she is feeling well and may be due a blood count and chemistry but beyond that would not do routine CAT scans and serum tumor marker testing in the absence of symptoms.  We will get her with palliative care to assist with the diarrhea and with our nutritionist to help with her weight loss which is due to decreased oral intake.    Discussed with patient 50 minutes greater than 50% spent counseling      Matheus Bryan MD    03/07/2019

## 2019-03-08 ENCOUNTER — DOCUMENTATION (OUTPATIENT)
Dept: PALLIATIVE CARE | Facility: CLINIC | Age: 74
End: 2019-03-08

## 2019-03-11 LAB
CGA SERPL-SCNC: 2 NMOL/L (ref 0–5)
SEROTONIN PLAS-MCNC: 0 NG/ML (ref 0–420)

## 2019-03-14 ENCOUNTER — HOSPITAL ENCOUNTER (OUTPATIENT)
Dept: CT IMAGING | Facility: HOSPITAL | Age: 74
Discharge: HOME | End: 2019-03-14
Admitting: INTERNAL MEDICINE

## 2019-03-14 ENCOUNTER — DOCUMENTATION (OUTPATIENT)
Dept: NUTRITION | Facility: HOSPITAL | Age: 74
End: 2019-03-14

## 2019-03-14 ENCOUNTER — LAB (OUTPATIENT)
Dept: LAB | Facility: HOSPITAL | Age: 74
End: 2019-03-14

## 2019-03-14 DIAGNOSIS — D37.3 LOW GRADE MUCINOUS NEOPLASM OF APPENDIX: Chronic | ICD-10-CM

## 2019-03-14 PROCEDURE — 0 DIATRIZOATE MEGLUMINE & SODIUM PER 1 ML: Performed by: INTERNAL MEDICINE

## 2019-03-14 PROCEDURE — 83497 ASSAY OF 5-HIAA: CPT

## 2019-03-14 PROCEDURE — 25010000002 IOPAMIDOL 61 % SOLUTION: Performed by: INTERNAL MEDICINE

## 2019-03-14 PROCEDURE — 74177 CT ABD & PELVIS W/CONTRAST: CPT

## 2019-03-14 PROCEDURE — 81050 URINALYSIS VOLUME MEASURE: CPT

## 2019-03-14 RX ADMIN — DIATRIZOATE MEGLUMINE AND DIATRIZOATE SODIUM 15 ML: 660; 100 LIQUID ORAL; RECTAL at 13:00

## 2019-03-14 RX ADMIN — IOPAMIDOL 95 ML: 612 INJECTION, SOLUTION INTRAVENOUS at 14:07

## 2019-03-14 NOTE — PROGRESS NOTES
ON[facility] Nutrition    Diagnosis: Low-grade mucinous neoplasm of the appendix  Treatment:  No routine serologic or scanning is recommended in the absence of symptoms    Weight 127 lbs. 3/7/19 145 lbs 12/7/19 18 lbs recent weight loss (12%)     Post-operatively patient has been experiencing diarrhea, contributing to weight loss.  Referral received from Dr. Bryan office; phone consult attempted; no answer; message left requesting return phone call. Patient is aware that I will be out of the office for a few days.

## 2019-03-20 LAB
5OH-INDOLEACETATE 24H UR-MCNC: 4.9 MG/L
5OH-INDOLEACETATE 24H UR-MRATE: 4.4 MG/24 HR (ref 0–14.9)

## 2019-03-21 ENCOUNTER — OFFICE VISIT (OUTPATIENT)
Dept: ONCOLOGY | Facility: CLINIC | Age: 74
End: 2019-03-21

## 2019-03-21 ENCOUNTER — DOCUMENTATION (OUTPATIENT)
Dept: NUTRITION | Facility: HOSPITAL | Age: 74
End: 2019-03-21

## 2019-03-21 VITALS
TEMPERATURE: 97.6 F | HEART RATE: 55 BPM | WEIGHT: 126 LBS | OXYGEN SATURATION: 100 % | BODY MASS INDEX: 22.32 KG/M2 | HEIGHT: 63 IN | SYSTOLIC BLOOD PRESSURE: 115 MMHG | DIASTOLIC BLOOD PRESSURE: 55 MMHG | RESPIRATION RATE: 12 BRPM

## 2019-03-21 DIAGNOSIS — K52.9 COLITIS: ICD-10-CM

## 2019-03-21 DIAGNOSIS — D3A.020 APPENDICEAL CARCINOID TUMOR: ICD-10-CM

## 2019-03-21 DIAGNOSIS — D37.3 LOW GRADE MUCINOUS NEOPLASM OF APPENDIX: Primary | Chronic | ICD-10-CM

## 2019-03-21 PROCEDURE — 99214 OFFICE O/P EST MOD 30 MIN: CPT | Performed by: NURSE PRACTITIONER

## 2019-03-21 NOTE — PROGRESS NOTES
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    Problem List:  Oncology/Hematology History    1. 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  Laparoscopic resection of appendix showed 4 cm low grade appendiceal mucinous neoplasm well-differentiated with invasion into the muscularis propria into the subserosa focally extending to the serosal surface making this a pathological T4.  Margins were all negative.  No lymphovascular invasion.  Nodes not sampled.  In the same specimen at the base of the appendix was a 5 mm grade 1 well-differentiated carcinoid with the base of the appendix margin positive.  No perineural or lymph vascular invasion.  Subsequently presented to Dr. Dante Rodriguez and then to our multidisciplinary complex GI conference.  The consensus was to scan her thoroughly preferably with a gallium 68 Dotetate scan if possible and then do a right hemicolectomy and then consider adjuvant therapy based on the pathologic staging at that junction.  I saw for the first time on 11/20/18.  Low-grade mucinous neoplasms of the appendix with the potential of this having perforated does have a high risk of peritoneal spread and adjuvant chemotherapy as of dubious benefit.  Appendiceal carcinoids generally are coincidentally found an rarely spread to the liver.  Nonetheless I would like to get gallium-68 Dotetate PET and we'll check her chromogranin A, serotonin, and 24-hour urine 5-HIAA.  Assuming that's negative then I would proceed with a right hemicolectomy given the positive margin at the base of the appendectomy and I would make adjuvant decisions based on what Dr. Rodriguez found  intraoperatively and on pathologic staging.  I will also would add parenthetically that low-grade mucinous carcinomas are generally chemotherapy insensitive and she may prefer to have her surgery done in a place where were they to stumble upon peritoneal involvement of this process that she could do Hipec.  Her daughter lives in [place] and they have the capability at [facility].  With reference to the carcinoid component of this, generally the 5-FU based adjuvant chemotherapy we would give for colon cancer is not helpful for that either but would usually involve tyrosine kinase inhibitors or Temodar and or Avastin but that is not necessarily in an adjuvant setting.  Hence, based on the potential complexity of surgery she was subsequently sent to Dr. Hunter at [facility] who has familiarity with Hipec.    -11/27/2018 PET negative    -12/5/2018 gallium-68 Dotetate PET negative    -12/17/2018 colonoscopy showed hyperplastic polyp 12 mm in the sigmoid colon    -12/18/2018 robotic right hemicolectomy with small bowel resection by Dr. Hunter was performed.  Pathology showed no residual goblet cell carcinoid.  Margins negative for malignancy and no disease in 15 lymph nodes sampled.  Perioperative CT scans showed no distant metastases according to Dr. Hunter's note.    -2/12/2019: Followed up with Dr. Hunter.  He recommended follow-up surveillance with me and to see if survivorship at .     -3/7/2019 office visit: Saw me for the first time postoperatively.  Reviewed the above data with the patient.  While with the goblet cell carcinoid there is a higher risk of recurrence, there is no adjuvant data to suggest treatment at this junction.  She is now nearly 3 months out postoperatively. There is no indication for postoperative treatment. NCCN guidelines suggests biochemical markers and scanning only as clinically indicated with tumors less than 2 cm and this was a 0.5 mm tumor.  For the low-grade mucinous neoplasm of the  appendix, no routine serologic or scanning is recommended in the absence of symptoms.  There was no hint of peritoneal involvement according to Dr. Hunter.  Nonetheless, she continues to lose weight with a poor appetite and there is not been a postoperative scan done yet so I will established at baseline now and get her to my nutritionists to work with her diet and have her see my nurse practitioner back in a few weeks to make sure her weight has stabilized and that the scan showed no sinister evidence of metastasis.  The weight loss is almost assuredly decreased intake trying to deal with her postoperative diarrhea.  Though I would not routinely do chromogranin A, serotonin, and urine 5-HIAA, with her diarrhea I will check that one time though I think that this is almost assuredly postoperative in nature.  If all of this is negative then I would just see her quarterly with my nurse practitioner to make sure she is feeling well and may be due a blood count and chemistry but beyond that would not do routine CAT scans and serum tumor marker testing in the absence of symptoms.  We will get her with palliative care to assist with the diarrhea and with our nutritionist to help with her weight loss which is due to decreased oral intake.    -3/21/2019 office visit: CT of abdomen and pelvis Impression: Diffuse abnormal wall thickening identified of the colon. Findings suggestive of a diffuse colitis.. Several stones seen within the gallbladder. No definite evidence of metastatic or recurrent disease. Chromogranin A, serotonin, and urine 5-HIAA from 3/7/2019 were all normal. She will see palliative care 4/23/2019 and I am making a referral to GI in [place] for assistance with the diffuse colitis. Patient wants GI referral closer to her home.           Low grade mucinous neoplasm of appendix    11/20/2018 Initial Diagnosis     Low grade mucinous neoplasm of appendix         3/14/2019 Imaging     CT of abdomen and pelvis  IMPRESSION: Diffuse abnormal wall thickening identified of the colon. Findings suggestive of a diffuse colitis.. Several stones seen within the gallbladder. No definite evidence of metastatic or recurrent disease.             HISTORY OF PRESENT ILLNESS:  The patient is a 73 y.o. female, here for follow up on management of low-grade mucinous neoplasm of the appendix as well as goblet cell carcinoid.  She is having diarrhea and weight loss postoperatively. She has lost 1 pound since her last visit. She has spoken with the nutritionist regarding suggestions to increase calories and food that might help with the diarrhea. She continues to have 4-5 stools day that can vary from soft to diarrhea. She continues to take imodium PRN.       No past medical history on file.  Past Surgical History:   Procedure Laterality Date   • APPENDECTOMY     •  SECTION     • OOPHORECTOMY Right        Allergies   Allergen Reactions   • Cefuroxime Rash   • Sulfa Antibiotics Rash       Family History and Social History reviewed and changed as necessary      REVIEW OF SYSTEM:   Review of Systems   Constitutional: Negative for appetite change, chills, diaphoresis, fatigue, fever and unexpected weight change.   HENT:   Negative for mouth sores, sore throat and trouble swallowing.    Eyes: Negative for icterus.   Respiratory: Negative for cough, hemoptysis and shortness of breath.    Cardiovascular: Negative for chest pain, leg swelling and palpitations.   Gastrointestinal: Negative for abdominal distention, abdominal pain, blood in stool, constipation, nausea and vomiting.   Endocrine: Negative for hot flashes.   Genitourinary: Negative for bladder incontinence, difficulty urinating, dysuria, frequency and hematuria.    Musculoskeletal: Negative for gait problem, neck pain and neck stiffness.   Skin: Negative for rash.   Neurological: Negative for dizziness, gait problem, headaches, light-headedness and numbness.   Hematological:  "Negative for adenopathy. Does not bruise/bleed easily.   Psychiatric/Behavioral: Negative for depression. The patient is not nervous/anxious.    All other systems reviewed and are negative.       PHYSICAL EXAM    Vitals:    03/21/19 1017   BP: 115/55   Pulse: 55   Resp: 12   Temp: 97.6 °F (36.4 °C)   TempSrc: Temporal   SpO2: 100%   Weight: 57.2 kg (126 lb)   Height: 158.8 cm (62.5\")     Constitutional: Appears well-developed and well-nourished. No distress.   ECOG: (2) Ambulatory and capable of self care, unable to carry out work activity, up and about > 50% or waking hours  HENT:   Head: Normocephalic.   Mouth/Throat: Oropharynx is clear and moist.   Eyes: Conjunctivae are normal. Pupils are equal, round, and reactive to light. No scleral icterus.   Neck: Neck supple. No JVD present. No thyromegaly present.   Cardiovascular: Normal rate, regular rhythm and normal heart sounds.    Pulmonary/Chest: Breath sounds normal. No respiratory distress.   Abdominal: Soft. Exhibits no distension and no mass. There is no hepatosplenomegaly. There is no tenderness. There is no rebound and no guarding.   Musculoskeletal:Exhibits no edema, tenderness or deformity.   Neurological: Alert and oriented to person, place, and time. Exhibits normal muscle tone.   Skin: No ecchymosis, no petechiae and no rash noted. Not diaphoretic. No cyanosis. Nails show no clubbing.   Psychiatric: Normal mood and affect.   Vitals reviewed.      Lab Results   Component Value Date    HGB 12.1 03/07/2019    HCT 36.0 03/07/2019    MCV 92.6 03/07/2019     03/07/2019    WBC 8.20 03/07/2019    NEUTROABS 5.40 03/07/2019    LYMPHSABS 2.40 03/07/2019    MONOSABS 0.40 03/07/2019    EOSABS 0.35 (H) 11/27/2018    BASOSABS 0.03 11/27/2018       Lab Results   Component Value Date    GLUCOSE 112 (H) 03/07/2019    BUN 16 03/07/2019    CREATININE 0.77 03/07/2019     03/07/2019    K 3.4 (L) 03/07/2019     03/07/2019    CO2 31.0 03/07/2019    CALCIUM " 9.6 03/07/2019    PROTEINTOT 7.0 03/07/2019    ALBUMIN 4.76 03/07/2019    BILITOT 1.4 (H) 03/07/2019    ALKPHOS 63 03/07/2019    AST 29 03/07/2019    ALT 20 03/07/2019     5 HIAA, Urine, Quantitative, 24 Hour - Urine, Clean Catch 3/14/2019  Order: [id number]   Status:  Final result   Visible to patient:  No (Not Released) Dx:  Low grade mucinous neoplasm of appendix    Ref Range & Units 7d ago   5-HIAA, Urine Undefined mg/L 4.9    Comment: This test was developed and its performance characteristics   determined by [facility]. It has not been cleared or approved   by the Food and Drug Administration.   5-HIAA, 24H Ur 0.0 - 14.9 mg/24 hr 4.4            3/7/2019  Chromogranin A 0 - 5 nmol/L 2      3/7/2019  Serotonin, Serum 0 - 420 ng/mL 0                ASSESSMENT & PLAN:    3. 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 0.5 cm T2 N0 M0 invading the muscularis propria stage I appendiceal goblet cell carcinoid     -10/22/18 CT abdomen pelvis [facility] showed small bowel obstruction in the distal ileum with trace ascites.  Laparoscopic resection of appendix showed 4 cm low grade appendiceal mucinous neoplasm well-differentiated with invasion into the muscularis propria into the subserosa focally extending to the serosal surface making this a pathological T4.  Margins were all negative.  No lymphovascular invasion.  Nodes not sampled.  In the same specimen at the base of the appendix was a 5 mm grade 1 well-differentiated carcinoid with the base of the appendix margin positive.  No perineural or lymph vascular invasion.  Subsequently presented to Dr. Dante Rodriguez and then to our multidisciplinary complex GI conference.  The consensus was to scan her thoroughly preferably with a gallium 68 Dotetate scan if possible and then do a right hemicolectomy and then consider adjuvant therapy based on the pathologic staging at that junction.  I saw for the first time on 11/20/18.   Low-grade mucinous neoplasms of the appendix with the potential of this having perforated does have a high risk of peritoneal spread and adjuvant chemotherapy as of dubious benefit.  Appendiceal carcinoids generally are coincidentally found an rarely spread to the liver.  Nonetheless I would like to get gallium-68 Dotetate PET and we'll check her chromogranin A, serotonin, and 24-hour urine 5-HIAA.  Assuming that's negative then I would proceed with a right hemicolectomy given the positive margin at the base of the appendectomy and I would make adjuvant decisions based on what Dr. Rodriguez found intraoperatively and on pathologic staging.  I will also would add parenthetically that low-grade mucinous carcinomas are generally chemotherapy insensitive and she may prefer to have her surgery done in a place where were they to stumble upon peritoneal involvement of this process that she could do Hipec.  Her daughter lives in [place] and they have the capability at [facility].  With reference to the carcinoid component of this, generally the 5-FU based adjuvant chemotherapy we would give for colon cancer is not helpful for that either but would usually involve tyrosine kinase inhibitors or Temodar and or Avastin but that is not necessarily in an adjuvant setting.  Hence, based on the potential complexity of surgery she was subsequently sent to Dr. Hunter at [facility] who has familiarity with Hipec.    -11/27/2018 PET negative    -12/5/2018 gallium-68 Dotetate PET negative    -12/17/2018 colonoscopy showed hyperplastic polyp 12 mm in the sigmoid colon    -12/18/2018 robotic right hemicolectomy with small bowel resection by Dr. Hunter was performed.  Pathology showed no residual goblet cell carcinoid.  Margins negative for malignancy and no disease in 15 lymph nodes sampled.  Perioperative CT scans showed no distant metastases according to Dr. Hunter's note.    -2/12/2019: Followed up with Dr. Hunter.  He recommended  follow-up surveillance with me and to see if survivorship at .     -3/7/2019 office visit: Saw me for the first time postoperatively.  Reviewed the above data with the patient.  While with the goblet cell carcinoid there is a higher risk of recurrence, there is no adjuvant data to suggest treatment at this junction.  She is now nearly 3 months out postoperatively. There is no indication for postoperative treatment. NCCN guidelines suggests biochemical markers and scanning only as clinically indicated with tumors less than 2 cm and this was a 0.5 mm tumor.  For the low-grade mucinous neoplasm of the appendix, no routine serologic or scanning is recommended in the absence of symptoms.  There was no hint of peritoneal involvement according to Dr. Hunter.  Nonetheless, she continues to lose weight with a poor appetite and there is not been a postoperative scan done yet so I will established at baseline now and get her to my nutritionists to work with her diet and have her see my nurse practitioner back in a few weeks to make sure her weight has stabilized and that the scan showed no sinister evidence of metastasis.  The weight loss is almost assuredly decreased intake trying to deal with her postoperative diarrhea.  Though I would not routinely do chromogranin A, serotonin, and urine 5-HIAA, with her diarrhea I will check that one time though I think that this is almost assuredly postoperative in nature.  If all of this is negative then I would just see her quarterly with my nurse practitioner to make sure she is feeling well and may be due a blood count and chemistry but beyond that would not do routine CAT scans and serum tumor marker testing in the absence of symptoms.  We will get her with palliative care to assist with the diarrhea and with our nutritionist to help with her weight loss which is due to decreased oral intake.      -3/21/2019 office visit: CT of abdomen and pelvis Impression: Diffuse abnormal wall  thickening identified of the colon. Findings suggestive of a diffuse colitis. Several stones seen within the gallbladder. No definite evidence of metastatic or recurrent disease. Chromogranin A, serotonin, and urine 5-HIAA from 3/7/2019 were all normal. She will see palliative care 4/23/2019 and I am making a referral to GI in [place] for assistance with the diffuse colitis. Patient wants GI referral closer to her home. I will see her back in 3 months for follow up evaluation. She has been instructed to contact us in the interm if any new symptoms arise.       Discussed with patient 25 minutes greater than 50% spent counseling regarding lab and scan results, symptom management, and referral       Shu Camargo, IVAN  03/21/2019

## 2019-03-21 NOTE — PROGRESS NOTES
[facility]     Diagnosis: Low-grade mucinous neoplasm of the appendix  Treatment:  2/18/2018 Robotic right hemicolectomy with small bowel resection; pathology showed no residual goblet cell carcinoid.  Margins negative for malignancy and no disease in 15 lymph nodes sampled.  Perioperative CT scans showed no distant metastases No routine serologic or scanning is recommended in the absence of symptoms     Weight 126 lbs. 3/7/19            145 lbs 12/7/19           160 lbs 10/19  34 lbs weight loss / (21% weight loss)    Long phone consult with patient addressing issues of 21% weight loss, muscle and fat loss, frequent loose stools.  Patient states that she is experiencing 4-6 loose stools per day, sometimes necessitating the use of anti-diarrheals for management; issues have been present since December surgery.  She stated that she was told to eat whatever she wanted following surgery, but knew that she could not with the symptoms that she was experiencing.  She has tried to follow a bland diet for most of the post-operative period. She states that she has a remote history of IBS. She also has hemorrhoids which have further complicated bowel management.    Reviewed current nutritional intake which indicates that patient's diet is inadequate in calorie, protein and nutrient intake to meet current nutritional needs, thus contributing to ongoing weight loss and malnutrition.  Patient education provided for increasing calorie & protein intake, encouraging smaller, more frequent meals vs the larger meal, less frequent meals that she has been eating; review of fruits and vegetables that should be tolerated vs those that may continue to cause issues until bowel management and tolerance is established; increasing the intake of soluble fiber to improve bowel management and hemorrhoids; possible lactose intolerance.  Reviewed high protein food sources with encouragement to have protein with each meal and snack; discussed  food preparation.  Patient very appreciative of information and expressed that the guidance in helping her resolve her nutritional issues was extremely helpful.    Patient encouraged to call back for any questions; she will follow up in two weeks to update progress in improving nutritional status.

## 2019-03-27 ENCOUNTER — DOCUMENTATION (OUTPATIENT)
Dept: NUTRITION | Facility: HOSPITAL | Age: 74
End: 2019-03-27

## 2019-03-27 NOTE — PROGRESS NOTES
"[facility]    Phone follow up with patient today.  She states that she had a scan last week that noted colitis; ate a high fiber protein bar yesterday which led to abdominal pain and diarrhea, which she treated with Immodium.  She states that she then became very anxious and emotionally \"has been a mess\".  Addressed patient's questions; emailed written information on colitis and reinforced much of the patient education that we discussed last week as the recommendations were basically the same.  Patient stated that she felt much better after the conversation.  Patient encouraged to call back for additional questions.  "

## 2019-06-14 DIAGNOSIS — Z51.81 THERAPEUTIC DRUG MONITORING: Primary | ICD-10-CM

## 2019-06-18 ENCOUNTER — OFFICE VISIT (OUTPATIENT)
Dept: PALLIATIVE CARE | Facility: CLINIC | Age: 74
End: 2019-06-18

## 2019-06-18 ENCOUNTER — LAB (OUTPATIENT)
Dept: LAB | Facility: HOSPITAL | Age: 74
End: 2019-06-18

## 2019-06-18 ENCOUNTER — OFFICE VISIT (OUTPATIENT)
Dept: ONCOLOGY | Facility: CLINIC | Age: 74
End: 2019-06-18

## 2019-06-18 VITALS
SYSTOLIC BLOOD PRESSURE: 124 MMHG | DIASTOLIC BLOOD PRESSURE: 63 MMHG | TEMPERATURE: 98 F | OXYGEN SATURATION: 99 % | BODY MASS INDEX: 21.44 KG/M2 | HEIGHT: 63 IN | WEIGHT: 121 LBS | RESPIRATION RATE: 14 BRPM | HEART RATE: 94 BPM

## 2019-06-18 VITALS
SYSTOLIC BLOOD PRESSURE: 124 MMHG | WEIGHT: 121.9 LBS | BODY MASS INDEX: 21.94 KG/M2 | HEART RATE: 94 BPM | OXYGEN SATURATION: 97 % | DIASTOLIC BLOOD PRESSURE: 63 MMHG

## 2019-06-18 DIAGNOSIS — D37.3 LOW GRADE MUCINOUS NEOPLASM OF APPENDIX: Primary | ICD-10-CM

## 2019-06-18 DIAGNOSIS — Z51.81 THERAPEUTIC DRUG MONITORING: ICD-10-CM

## 2019-06-18 DIAGNOSIS — K64.4 INFLAMED EXTERNAL HEMORRHOID: Primary | ICD-10-CM

## 2019-06-18 DIAGNOSIS — K59.1 FUNCTIONAL DIARRHEA: ICD-10-CM

## 2019-06-18 DIAGNOSIS — F41.1 GENERALIZED ANXIETY DISORDER: ICD-10-CM

## 2019-06-18 DIAGNOSIS — R19.7 DIARRHEA, UNSPECIFIED TYPE: ICD-10-CM

## 2019-06-18 DIAGNOSIS — D3A.020 APPENDICEAL CARCINOID TUMOR: ICD-10-CM

## 2019-06-18 DIAGNOSIS — Z78.9 OTHER SPECIFIED HEALTH STATUS: ICD-10-CM

## 2019-06-18 LAB
ALBUMIN SERPL-MCNC: 4.6 G/DL (ref 3.5–5.2)
ALBUMIN/GLOB SERPL: 1.5 G/DL
ALP SERPL-CCNC: 58 U/L (ref 39–117)
ALT SERPL W P-5'-P-CCNC: 17 U/L (ref 1–33)
AMPHET+METHAMPHET UR QL: NEGATIVE
AMPHETAMINES UR QL: NEGATIVE
ANION GAP SERPL CALCULATED.3IONS-SCNC: 12 MMOL/L
AST SERPL-CCNC: 22 U/L (ref 1–32)
BARBITURATES UR QL SCN: NEGATIVE
BENZODIAZ UR QL SCN: NEGATIVE
BILIRUB SERPL-MCNC: 1.2 MG/DL (ref 0.2–1.2)
BUN BLD-MCNC: 22 MG/DL (ref 8–23)
BUN/CREAT SERPL: 28.2 (ref 7–25)
BUPRENORPHINE SERPL-MCNC: NEGATIVE NG/ML
CALCIUM SPEC-SCNC: 9.8 MG/DL (ref 8.6–10.5)
CANNABINOIDS SERPL QL: NEGATIVE
CHLORIDE SERPL-SCNC: 103 MMOL/L (ref 98–107)
CO2 SERPL-SCNC: 24 MMOL/L (ref 22–29)
COCAINE UR QL: NEGATIVE
CREAT BLD-MCNC: 0.78 MG/DL (ref 0.57–1)
ERYTHROCYTE [DISTWIDTH] IN BLOOD BY AUTOMATED COUNT: 14 % (ref 12.3–15.4)
GFR SERPL CREATININE-BSD FRML MDRD: 73 ML/MIN/1.73
GLOBULIN UR ELPH-MCNC: 3.1 GM/DL
GLUCOSE BLD-MCNC: 87 MG/DL (ref 65–99)
HCT VFR BLD AUTO: 36.9 % (ref 34–46.6)
HGB BLD-MCNC: 12.7 G/DL (ref 12–15.9)
LYMPHOCYTES # BLD AUTO: 3.1 10*3/MM3 (ref 0.7–3.1)
LYMPHOCYTES NFR BLD AUTO: 34.5 % (ref 19.6–45.3)
MCH RBC QN AUTO: 31.5 PG (ref 26.6–33)
MCHC RBC AUTO-ENTMCNC: 34.5 G/DL (ref 31.5–35.7)
MCV RBC AUTO: 91.2 FL (ref 79–97)
METHADONE UR QL SCN: NEGATIVE
MONOCYTES # BLD AUTO: 0.5 10*3/MM3 (ref 0.1–0.9)
MONOCYTES NFR BLD AUTO: 5.5 % (ref 5–12)
NEUTROPHILS # BLD AUTO: 5.4 10*3/MM3 (ref 1.7–7)
NEUTROPHILS NFR BLD AUTO: 60 % (ref 42.7–76)
OPIATES UR QL: NEGATIVE
OXYCODONE UR QL SCN: NEGATIVE
PCP UR QL SCN: NEGATIVE
PLATELET # BLD AUTO: 385 10*3/MM3 (ref 140–450)
PMV BLD AUTO: 7.2 FL (ref 6–12)
POTASSIUM BLD-SCNC: 4.8 MMOL/L (ref 3.5–5.2)
PROPOXYPH UR QL: NEGATIVE
PROT SERPL-MCNC: 7.7 G/DL (ref 6–8.5)
RBC # BLD AUTO: 4.05 10*6/MM3 (ref 3.77–5.28)
SODIUM BLD-SCNC: 139 MMOL/L (ref 136–145)
TRICYCLICS UR QL SCN: POSITIVE
WBC NRBC COR # BLD: 9 10*3/MM3 (ref 3.4–10.8)

## 2019-06-18 PROCEDURE — 99214 OFFICE O/P EST MOD 30 MIN: CPT | Performed by: NURSE PRACTITIONER

## 2019-06-18 PROCEDURE — 80053 COMPREHEN METABOLIC PANEL: CPT | Performed by: NURSE PRACTITIONER

## 2019-06-18 PROCEDURE — 86316 IMMUNOASSAY TUMOR OTHER: CPT | Performed by: NURSE PRACTITIONER

## 2019-06-18 PROCEDURE — 80306 DRUG TEST PRSMV INSTRMNT: CPT

## 2019-06-18 PROCEDURE — 84260 ASSAY OF SEROTONIN: CPT | Performed by: NURSE PRACTITIONER

## 2019-06-18 PROCEDURE — 85025 COMPLETE CBC W/AUTO DIFF WBC: CPT | Performed by: NURSE PRACTITIONER

## 2019-06-18 PROCEDURE — 36415 COLL VENOUS BLD VENIPUNCTURE: CPT | Performed by: NURSE PRACTITIONER

## 2019-06-18 PROCEDURE — 99205 OFFICE O/P NEW HI 60 MIN: CPT | Performed by: INTERNAL MEDICINE

## 2019-06-18 RX ORDER — HYDROCODONE BITARTRATE AND ACETAMINOPHEN 5; 325 MG/1; MG/1
1 TABLET ORAL EVERY 8 HOURS PRN
Qty: 90 TABLET | Refills: 0 | Status: SHIPPED | OUTPATIENT
Start: 2019-06-18 | End: 2020-01-20

## 2019-06-18 RX ORDER — DIPHENOXYLATE HYDROCHLORIDE AND ATROPINE SULFATE 2.5; .025 MG/1; MG/1
1 TABLET ORAL 4 TIMES DAILY PRN
Refills: 1 | COMMUNITY
Start: 2019-04-29 | End: 2020-01-20

## 2019-06-18 RX ORDER — MEGESTROL ACETATE 20 MG/1
20 TABLET ORAL 2 TIMES DAILY
Refills: 3 | COMMUNITY
Start: 2019-05-29 | End: 2020-01-20

## 2019-06-18 RX ORDER — CLONAZEPAM 1 MG/1
1 TABLET ORAL 2 TIMES DAILY PRN
Refills: 1 | COMMUNITY
Start: 2019-06-12 | End: 2022-07-14

## 2019-06-18 RX ORDER — HYDROCODONE BITARTRATE AND ACETAMINOPHEN 10; 325 MG/1; MG/1
1 TABLET ORAL EVERY 6 HOURS PRN
Refills: 0 | COMMUNITY
Start: 2019-05-24 | End: 2019-06-18

## 2019-06-18 RX ORDER — SERTRALINE HYDROCHLORIDE 100 MG/1
100 TABLET, FILM COATED ORAL DAILY
Refills: 3 | COMMUNITY
Start: 2019-05-29

## 2019-06-18 RX ORDER — AMITRIPTYLINE HYDROCHLORIDE 10 MG/1
10 TABLET, FILM COATED ORAL
Refills: 2 | COMMUNITY
Start: 2019-05-29 | End: 2021-07-14 | Stop reason: DRUGHIGH

## 2019-06-18 NOTE — PROGRESS NOTES
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    Problem List:  Oncology/Hematology History    1. 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 0.5 cm T2 N0 M0 invading the muscularis propria stage I appendiceal goblet cell carcinoid     -10/22/18 CT abdomen pelvis [facility] showed small bowel obstruction in the distal ileum with trace ascites.  Laparoscopic resection of appendix showed 4 cm low grade appendiceal mucinous neoplasm well-differentiated with invasion into the muscularis propria into the subserosa focally extending to the serosal surface making this a pathological T4.  Margins were all negative.  No lymphovascular invasion.  Nodes not sampled.  In the same specimen at the base of the appendix was a 5 mm grade 1 well-differentiated carcinoid with the base of the appendix margin positive.  No perineural or lymph vascular invasion.  Subsequently presented to Dr. Dante Rodriguez and then to our multidisciplinary complex GI conference.  The consensus was to scan her thoroughly preferably with a gallium 68 Dotetate scan if possible and then do a right hemicolectomy and then consider adjuvant therapy based on the pathologic staging at that junction.  I saw for the first time on 11/20/18.  Low-grade mucinous neoplasms of the appendix with the potential of this having perforated does have a high risk of peritoneal spread and adjuvant chemotherapy as of dubious benefit.  Appendiceal carcinoids generally are coincidentally found an rarely spread to the liver.  Nonetheless I would like to get gallium-68 Dotetate PET and we'll check her chromogranin A, serotonin, and 24-hour urine 5-HIAA.  Assuming that's negative then I would proceed with a right hemicolectomy given the positive margin at the base of the appendectomy and I would make adjuvant decisions based on what Dr. Rodriguez found  intraoperatively and on pathologic staging.  I will also would add parenthetically that low-grade mucinous carcinomas are generally chemotherapy insensitive and she may prefer to have her surgery done in a place where were they to stumble upon peritoneal involvement of this process that she could do Hipec.  Her daughter lives in [place] and they have the capability at [facility].  With reference to the carcinoid component of this, generally the 5-FU based adjuvant chemotherapy we would give for colon cancer is not helpful for that either but would usually involve tyrosine kinase inhibitors or Temodar and or Avastin but that is not necessarily in an adjuvant setting.  Hence, based on the potential complexity of surgery she was subsequently sent to Dr. Hunter at [facility] who has familiarity with Hipec.    -11/27/2018 PET negative    -12/5/2018 gallium-68 Dotetate PET negative    -12/17/2018 colonoscopy showed hyperplastic polyp 12 mm in the sigmoid colon    -12/18/2018 robotic right hemicolectomy with small bowel resection by Dr. Hunter was performed.  Pathology showed no residual goblet cell carcinoid.  Margins negative for malignancy and no disease in 15 lymph nodes sampled.  Perioperative CT scans showed no distant metastases according to Dr. Hunter's note.    -2/12/2019: Followed up with Dr. Hunter.  He recommended follow-up surveillance with me and to see if survivorship at .     -3/7/2019 office visit: Saw me for the first time postoperatively.  Reviewed the above data with the patient.  While with the goblet cell carcinoid there is a higher risk of recurrence, there is no adjuvant data to suggest treatment at this junction.  She is now nearly 3 months out postoperatively. There is no indication for postoperative treatment. NCCN guidelines suggests biochemical markers and scanning only as clinically indicated with tumors less than 2 cm and this was a 0.5 mm tumor.  For the low-grade mucinous neoplasm of the  appendix, no routine serologic or scanning is recommended in the absence of symptoms.  There was no hint of peritoneal involvement according to Dr. Hunter.  Nonetheless, she continues to lose weight with a poor appetite and there is not been a postoperative scan done yet so I will established at baseline now and get her to my nutritionists to work with her diet and have her see my nurse practitioner back in a few weeks to make sure her weight has stabilized and that the scan showed no sinister evidence of metastasis.  The weight loss is almost assuredly decreased intake trying to deal with her postoperative diarrhea.  Though I would not routinely do chromogranin A, serotonin, and urine 5-HIAA, with her diarrhea I will check that one time though I think that this is almost assuredly postoperative in nature.  If all of this is negative then I would just see her quarterly with my nurse practitioner to make sure she is feeling well and may be due a blood count and chemistry but beyond that would not do routine CAT scans and serum tumor marker testing in the absence of symptoms.  We will get her with palliative care to assist with the diarrhea and with our nutritionist to help with her weight loss which is due to decreased oral intake.    -3/21/2019 office visit: CT of abdomen and pelvis Impression: Diffuse abnormal wall thickening identified of the colon. Findings suggestive of a diffuse colitis.. Several stones seen within the gallbladder. No definite evidence of metastatic or recurrent disease. Chromogranin A, serotonin, and urine 5-HIAA from 3/7/2019 were all normal. She will see palliative care 4/23/2019 and I am making a referral to GI in [place] for assistance with the diffuse colitis. Patient wants GI referral closer to her home.           Low grade mucinous neoplasm of appendix    11/20/2018 Initial Diagnosis     Low grade mucinous neoplasm of appendix         3/14/2019 Imaging     CT of abdomen and pelvis  "IMPRESSION: Diffuse abnormal wall thickening identified of the colon. Findings suggestive of a diffuse colitis.. Several stones seen within the gallbladder. No definite evidence of metastatic or recurrent disease.             HISTORY OF PRESENT ILLNESS:  The patient is a 73 y.o. female, here for follow up on management of low-grade mucinous neoplasm of the appendix as well as goblet cell carcinoid. She continues to have 3-4 watery to soft stools a day. She reports \"excruciating\" pain rectally with each BM. She says the pain feels like razor blades in her rectum with the BM and for approximately 1 hour following. She says she has to soak in the bathtub for 20-30 minutes then applies preparation H with lidocaine to the rectum and covers it with Desitin. She says a few weeks ago she received Norco 5/325 mg po TID prn pain and that did give her some relief with her pain. She is taking imodium but has not been using Lomotil.  Her PCP gave her Megace for weight loss. She is trying to take in more calories but is routinely only taking in 1700 calories a day. She has lost 5 pounds since her last visit 3 months ago. She had to go to [facility] for [facility] fluids about 3 weeks ago. She had a repeat CT scan of abdomen and pelvis at that time that she reports looked good. She is concerned she could have disease recurrence.           History reviewed. No pertinent past medical history.  Past Surgical History:   Procedure Laterality Date   • APPENDECTOMY     •  SECTION     • OOPHORECTOMY Right        Allergies   Allergen Reactions   • Cefuroxime Rash   • Sulfa Antibiotics Rash       Family History and Social History reviewed and changed as necessary      REVIEW OF SYSTEM:   Review of Systems   Constitutional: Negative for appetite change, chills, diaphoresis, fatigue, fever and unexpected weight change.   HENT:   Negative for mouth sores, sore throat and trouble swallowing.    Eyes: Negative for icterus. " "  Respiratory: Negative for cough, hemoptysis and shortness of breath.    Cardiovascular: Negative for chest pain, leg swelling and palpitations.   Gastrointestinal: Negative for abdominal distention, abdominal pain, blood in stool, constipation, nausea and vomiting.   Endocrine: Negative for hot flashes.   Genitourinary: Negative for bladder incontinence, difficulty urinating, dysuria, frequency and hematuria.    Musculoskeletal: Negative for gait problem, neck pain and neck stiffness.   Skin: Negative for rash.   Neurological: Negative for dizziness, gait problem, headaches, light-headedness and numbness.   Hematological: Negative for adenopathy. Does not bruise/bleed easily.   Psychiatric/Behavioral: Negative for depression. The patient is not nervous/anxious.    All other systems reviewed and are negative.       PHYSICAL EXAM    Vitals:    06/18/19 1305   BP: 124/63   Pulse: 94   Resp: 14   Temp: 98 °F (36.7 °C)   SpO2: 99%   Weight: 54.9 kg (121 lb)   Height: 158.8 cm (62.5\")     Constitutional: Appears well-developed and well-nourished. No distress.   ECOG: (2) Ambulatory and capable of self care, unable to carry out work activity, up and about > 50% or waking hours  HENT:   Head: Normocephalic.   Mouth/Throat: Oropharynx is clear and moist.   Eyes: Conjunctivae are normal. Pupils are equal, round, and reactive to light. No scleral icterus.   Neck: Neck supple. No JVD present. No thyromegaly present.   Cardiovascular: Normal rate, regular rhythm and normal heart sounds.    Pulmonary/Chest: Breath sounds normal. No respiratory distress.   Abdominal: Soft. Exhibits no distension and no mass. There is no hepatosplenomegaly. There is no tenderness. There is no rebound and no guarding.   Musculoskeletal:Exhibits no edema, tenderness or deformity.   Neurological: Alert and oriented to person, place, and time. Exhibits normal muscle tone.   Skin: No ecchymosis, no petechiae and no rash noted. Not diaphoretic. No " cyanosis. Nails show no clubbing.   Psychiatric: Normal mood and affect.   Vitals reviewed.      Lab Results   Component Value Date    HGB 12.1 03/07/2019    HCT 36.0 03/07/2019    MCV 92.6 03/07/2019     03/07/2019    WBC 8.20 03/07/2019    NEUTROABS 5.40 03/07/2019    LYMPHSABS 2.40 03/07/2019    MONOSABS 0.40 03/07/2019    EOSABS 0.35 (H) 11/27/2018    BASOSABS 0.03 11/27/2018       Lab Results   Component Value Date    GLUCOSE 112 (H) 03/07/2019    BUN 16 03/07/2019    CREATININE 0.77 03/07/2019     03/07/2019    K 3.4 (L) 03/07/2019     03/07/2019    CO2 31.0 03/07/2019    CALCIUM 9.6 03/07/2019    PROTEINTOT 7.0 03/07/2019    ALBUMIN 4.76 03/07/2019    BILITOT 1.4 (H) 03/07/2019    ALKPHOS 63 03/07/2019    AST 29 03/07/2019    ALT 20 03/07/2019     5 HIAA, Urine, Quantitative, 24 Hour - Urine, Clean Catch 3/14/2019  Order: [id number]   Status:  Final result   Visible to patient:  No (Not Released) Dx:  Low grade mucinous neoplasm of appendix    Ref Range & Units 7d ago   5-HIAA, Urine Undefined mg/L 4.9    Comment: This test was developed and its performance characteristics   determined by [facility]. It has not been cleared or approved   by the Food and Drug Administration.   5-HIAA, 24H Ur 0.0 - 14.9 mg/24 hr 4.4            3/7/2019  Chromogranin A 0 - 5 nmol/L 2      3/7/2019  Serotonin, Serum 0 - 420 ng/mL 0                ASSESSMENT & PLAN:    3. 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 0.5 cm T2 N0 M0 invading the muscularis propria stage I appendiceal goblet cell carcinoid     -10/22/18 CT abdomen pelvis [facility] showed small bowel obstruction in the distal ileum with trace ascites.  Laparoscopic resection of appendix showed 4 cm low grade appendiceal mucinous neoplasm well-differentiated with invasion into the muscularis propria into the subserosa focally extending to the serosal surface making this a pathological T4.  Margins were  all negative.  No lymphovascular invasion.  Nodes not sampled.  In the same specimen at the base of the appendix was a 5 mm grade 1 well-differentiated carcinoid with the base of the appendix margin positive.  No perineural or lymph vascular invasion.  Subsequently presented to Dr. Dante Rodriguez and then to our multidisciplinary complex GI conference.  The consensus was to scan her thoroughly preferably with a gallium 68 Dotetate scan if possible and then do a right hemicolectomy and then consider adjuvant therapy based on the pathologic staging at that junction.  I saw for the first time on 11/20/18.  Low-grade mucinous neoplasms of the appendix with the potential of this having perforated does have a high risk of peritoneal spread and adjuvant chemotherapy as of dubious benefit.  Appendiceal carcinoids generally are coincidentally found an rarely spread to the liver.  Nonetheless I would like to get gallium-68 Dotetate PET and we'll check her chromogranin A, serotonin, and 24-hour urine 5-HIAA.  Assuming that's negative then I would proceed with a right hemicolectomy given the positive margin at the base of the appendectomy and I would make adjuvant decisions based on what Dr. Rodriguez found intraoperatively and on pathologic staging.  I will also would add parenthetically that low-grade mucinous carcinomas are generally chemotherapy insensitive and she may prefer to have her surgery done in a place where were they to stumble upon peritoneal involvement of this process that she could do Hipec.  Her daughter lives in [place] and they have the capability at [facility].  With reference to the carcinoid component of this, generally the 5-FU based adjuvant chemotherapy we would give for colon cancer is not helpful for that either but would usually involve tyrosine kinase inhibitors or Temodar and or Avastin but that is not necessarily in an adjuvant setting.  Hence, based on the potential complexity of surgery she was  subsequently sent to Dr. Hunter at [facility] who has familiarity with Hipec.    -11/27/2018 PET negative    -12/5/2018 gallium-68 Dotetate PET negative    -12/17/2018 colonoscopy showed hyperplastic polyp 12 mm in the sigmoid colon    -12/18/2018 robotic right hemicolectomy with small bowel resection by Dr. Hunter was performed.  Pathology showed no residual goblet cell carcinoid.  Margins negative for malignancy and no disease in 15 lymph nodes sampled.  Perioperative CT scans showed no distant metastases according to Dr. Hunter's note.    -2/12/2019: Followed up with Dr. Hunter.  He recommended follow-up surveillance with me and to see if survivorship at .     -3/7/2019 office visit: Saw me for the first time postoperatively.  Reviewed the above data with the patient.  While with the goblet cell carcinoid there is a higher risk of recurrence, there is no adjuvant data to suggest treatment at this junction.  She is now nearly 3 months out postoperatively. There is no indication for postoperative treatment. NCCN guidelines suggests biochemical markers and scanning only as clinically indicated with tumors less than 2 cm and this was a 0.5 mm tumor.  For the low-grade mucinous neoplasm of the appendix, no routine serologic or scanning is recommended in the absence of symptoms.  There was no hint of peritoneal involvement according to Dr. Hunter.  Nonetheless, she continues to lose weight with a poor appetite and there is not been a postoperative scan done yet so I will established at baseline now and get her to my nutritionists to work with her diet and have her see my nurse practitioner back in a few weeks to make sure her weight has stabilized and that the scan showed no sinister evidence of metastasis.  The weight loss is almost assuredly decreased intake trying to deal with her postoperative diarrhea.  Though I would not routinely do chromogranin A, serotonin, and urine 5-HIAA, with her diarrhea I will check that  one time though I think that this is almost assuredly postoperative in nature.  If all of this is negative then I would just see her quarterly with my nurse practitioner to make sure she is feeling well and may be due a blood count and chemistry but beyond that would not do routine CAT scans and serum tumor marker testing in the absence of symptoms.  We will get her with palliative care to assist with the diarrhea and with our nutritionist to help with her weight loss which is due to decreased oral intake.      -3/21/2019 office visit: CT of abdomen and pelvis Impression: Diffuse abnormal wall thickening identified of the colon. Findings suggestive of a diffuse colitis. Several stones seen within the gallbladder. No definite evidence of metastatic or recurrent disease. Chromogranin A, serotonin, and urine 5-HIAA from 3/7/2019 were all normal. She will see palliative care 4/23/2019 and I am making a referral to GI in [place] for assistance with the diffuse colitis. Patient wants GI referral closer to her home. I will see her back in 3 months for follow up evaluation. She has been instructed to contact us in the [facility] if any new symptoms arise.     -6/18/2019 office visit: Patient and family member are very concerned that patient is having excruciating rectal pain/weight loss and worried about disease recurrence. She had a CT scan about 3 weeks ago that patient reports was normal. I will request a copy of the scans. We will obtain CBC, CMP, Chromogranin A, serotonin, and 24 hour urine 5-HIAA today. I spoke with Dr. Bryan and he wants the patient to follow up with Dr. Hunter at [facility]. She will return to clinic after visit with Dr. Hunter in approximately 3 weeks to see Dr. Bryan per patient and family request. I will give her a short course of Norco 5/325 mg po TID prn pain. I explained we will not be giving this again but hopefully we can help her pain until she sees Dr. Hunter for further evaluation.            Discussed with patient 25 minutes greater than 50% spent counseling regarding lab and scan results, symptom management, and referral       Shu Camargo, APRN  06/18/2019

## 2019-06-18 NOTE — PATIENT INSTRUCTIONS
Diarrhea, Adult  Diarrhea is frequent loose and watery bowel movements. Diarrhea can make you feel weak and cause you to become dehydrated. Dehydration can make you tired and thirsty, cause you to have a dry mouth, and decrease how often you urinate. Diarrhea typically lasts 2-3 days. However, it can last longer if it is a sign of something more serious. It is important to treat your diarrhea as told by your health care provider.  Follow these instructions at home:  Eating and drinking  Follow these recommendations as told by your health care provider:  · Take an oral rehydration solution (ORS). This is a drink that is sold at pharmacies and retail stores.  · Drink clear fluids, such as water, ice chips, diluted fruit juice, and low-calorie sports drinks.  · Eat bland, easy-to-digest foods in small amounts as you are able. These foods include bananas, applesauce, rice, lean meats, toast, and crackers.  · Avoid drinking fluids that contain a lot of sugar or caffeine, such as energy drinks, sports drinks, and soda.  · Avoid alcohol.  · Avoid spicy or fatty foods.    General instructions  · Drink enough fluid to keep your urine clear or pale yellow.  · Wash your hands often. If soap and water are not available, use hand .  · Make sure that all people in your household wash their hands well and often.  · Take over-the-counter and prescription medicines only as told by your health care provider.  · Rest at home while you recover.  · Watch your condition for any changes.  · Take a warm bath to relieve any burning or pain from frequent diarrhea episodes.  · Keep all follow-up visits as told by your health care provider. This is important.  Contact a health care provider if:  · You have a fever.  · Your diarrhea gets worse.  · You have new symptoms.  · You cannot keep fluids down.  · You feel light-headed or dizzy.  · You have a headache  · You have muscle cramps.  Get help right away if:  · You have chest  pain.  · You feel extremely weak or you faint.  · You have bloody or black stools or stools that look like tar.  · You have severe pain, cramping, or bloating in your abdomen.  · You have trouble breathing or you are breathing very quickly.  · Your heart is beating very quickly.  · Your skin feels cold and clammy.  · You feel confused.  · You have signs of dehydration, such as:  ? Dark urine, very little urine, or no urine.  ? Cracked lips.  ? Dry mouth.  ? Sunken eyes.  ? Sleepiness.  ? Weakness.  This information is not intended to replace advice given to you by your health care provider. Make sure you discuss any questions you have with your health care provider.  Document Released: 12/08/2003 Document Revised: 08/01/2018 Document Reviewed: 08/23/2016  [employer] Interactive Patient Education © 2019 [employer]      Hemorrhoids  Hemorrhoids are swollen veins in and around the rectum or anus. There are two types of hemorrhoids:  · Internal hemorrhoids. These occur in the veins that are just inside the rectum. They may poke through to the outside and become irritated and painful.  · External hemorrhoids. These occur in the veins that are outside of the anus and can be felt as a painful swelling or hard lump near the anus.    Most hemorrhoids do not cause serious problems, and they can be managed with home treatments such as diet and lifestyle changes. If home treatments do not help your symptoms, procedures can be done to shrink or remove the hemorrhoids.  What are the causes?  This condition is caused by increased pressure in the anal area. This pressure may result from various things, including:  · Constipation.  · Straining to have a bowel movement.  · Diarrhea.  · Pregnancy.  · Obesity.  · Sitting for long periods of time.  · Heavy lifting or other activity that causes you to strain.  · Anal sex.    What are the signs or symptoms?  Symptoms of this condition include:  · Pain.  · Anal itching or  irritation.  · Rectal bleeding.  · Leakage of stool (feces).  · Anal swelling.  · One or more lumps around the anus.    How is this diagnosed?  This condition can often be diagnosed through a visual exam. Other exams or tests may also be done, such as:  · Examination of the rectal area with a gloved hand (digital rectal exam).  · Examination of the anal canal using a small tube (anoscope).  · A blood test, if you have lost a significant amount of blood.  · A test to look inside the colon (sigmoidoscopy or colonoscopy).    How is this treated?  This condition can usually be treated at home. However, various procedures may be done if dietary changes, lifestyle changes, and other home treatments do not help your symptoms. These procedures can help make the hemorrhoids smaller or remove them completely. Some of these procedures involve surgery, and others do not. Common procedures include:  · Rubber band ligation. Rubber bands are placed at the base of the hemorrhoids to cut off the blood supply to them.  · Sclerotherapy. Medicine is injected into the hemorrhoids to shrink them.  · Infrared coagulation. A type of light energy is used to get rid of the hemorrhoids.  · Hemorrhoidectomy surgery. The hemorrhoids are surgically removed, and the veins that supply them are tied off.  · Stapled hemorrhoidopexy surgery. A circular stapling device is used to remove the hemorrhoids and use staples to cut off the blood supply to them.    Follow these instructions at home:  Eating and drinking  · Eat foods that have a lot of fiber in them, such as whole grains, beans, nuts, fruits, and vegetables. Ask your health care provider about taking products that have added fiber (fiber supplements).  · Drink enough fluid to keep your urine clear or pale yellow.  Managing pain and swelling  · Take warm sitz baths for 20 minutes, 3-4 times a day to ease pain and discomfort.  · If directed, apply ice to the affected area. Using ice packs  between sitz baths may be helpful.  ? Put ice in a plastic bag.  ? Place a towel between your skin and the bag.  ? Leave the ice on for 20 minutes, 2-3 times a day.  General instructions  · Take over-the-counter and prescription medicines only as told by your health care provider.  · Use medicated creams or suppositories as told.  · Exercise regularly.  · Go to the bathroom when you have the urge to have a bowel movement. Do not wait.  · Avoid straining to have bowel movements.  · Keep the anal area dry and clean. Use wet toilet paper or moist towelettes after a bowel movement.  · Do not sit on the toilet for long periods of time. This increases blood pooling and pain.  Contact a health care provider if:  · You have increasing pain and swelling that are not controlled by treatment or medicine.  · You have uncontrolled bleeding.  · You have difficulty having a bowel movement, or you are unable to have a bowel movement.  · You have pain or inflammation outside the area of the hemorrhoids.  This information is not intended to replace advice given to you by your health care provider. Make sure you discuss any questions you have with your health care provider.  Document Released: 12/15/2001 Document Revised: 05/17/2017 Document Reviewed: 08/31/2016  [employer] Interactive Patient Education © 2019 [employer]    Overall plan:  1.  Address cognitive piece and diet modifications first (main treatment)  2.  Hemorrhoids to be addressed by colorectal surgeon  3.  Can consider pelvic floor physical therapy in future (ask around your local physical therapy offices) if rectal spasm pain persists after hemorrhoidectomy  4.  Consider ganglion impar block or other with Dr. Valiente at [facility] or Dr. Ferrari here in [facility] if rectal pain persists after hemorrhoidectomy  5.  Use your Lomotil to decrease frequency of bowel movements.    6.  Trial OTC Zantac twice daily for 1 month to see if acid BMs lessen  overall.  7.  Pepto-Bismol as needed every day to decrease acidity of BM.

## 2019-06-18 NOTE — PROGRESS NOTES
Pt presented to clinic today with sister for new appointment. Pt A&O and appropriate, well dressed. VSS.  Pt immediately had to use the BR. Biggest complaint was constant diarrhea and hemorrhoid pain. No relief to either except from Norco 10 that were prescribed. Covered, Clinic services and expectations, med disposal form, med count instructions. Verbalized understanding.

## 2019-06-18 NOTE — PROGRESS NOTES
Advanced care planning/goals:  Pt says she has a living will and poa but a  in [place] did these. Educated that these are usually honored but she could check on [place].gov to be sure.   She is now cancer and colitis free. She would like to feel better in terms of Hemoroids  and frequent stools.     Health care surrogate by document or law:  spouse    Recent events:  Recent scans found pt was cancer free.     Worries/concerns:  Long term anxiety. She his seeing a counselor.     Supports:  family    Coping:  Pt describes long history of anxiety, worry about everything. This has gotten worse since her cancer free scan so she has sought out counseling.     Summary:  Pt seen with her sister, lyla today. Reviewed palliative care support when folks face serious illness. She is now cancer free. She feels her primary might be comfortable continuing pain medication. Her sister had encouraged her to come to this appointment as she was unsure she needed to come after last scan.

## 2019-06-18 NOTE — PROGRESS NOTES
"    Subjective   Cony Hayden is a 73 y.o. female.     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  S/p lap appendectomy.    Referred to palliative for symptom assistance with diarrhea and weight maintenance, managed with PRN Immodium    Treatment plan:  Surveillance with [facility] clinical f/u    Symptoms: Diarrhea started in January, after surgery.  Has 3-4 BMs daily, mostly formed, but can be liquid and \"pure acid.\"  Painful hemorrhoids when BM \"like fire.\"  Rectal spasms at times.  Pain can be better when hemorrhoids bleed.  No abdominal cramping, nausea, vomiting.      Has stopped regular use of Lomotil, but admits it did manage frequency of BMs, due to concern of rebound symptoms.     Has used Norco PRN which helped decrease frequency of BMs and effective for pain.    Takes APAP ES, 1 tab twice a day, effective.    Report Ibuprofen most effective in past, but has not taken post-operatively due to advisement that it will delay healing.      Function: Independent of ADLs.  Avoids leaving home due to concern of fecal incontinence.  Wears Depends.    Support/Strengths:  .  Just established counseling locally yesterday.      Distress/Difficulties: Lives in [facility], travel burden for specialty care.  Anxiety, started on chronic Clonazepam at night for insomnia, by PCP last year.      Substance Use History: None    ACP/Goals: .  Noted currently without cancer.  Prognosis unlimited.   See LCSW note.  Has LW.    The following portions of the patient's history were reviewed and updated as appropriate: allergies, current medications, past family history, past medical history, past social history, past surgical history and problem list.    Review of Systems  Otherwise negative " except as below and as already detailed in HPI.    STEPHANIE:  Reviewed.  See scanned form in Media. No concerns.  Consistent with history.  Prescribers identified as members of care team.     Medication Counts:  Reviewed.  See RN note. Did not bring medication to appointment    CONTROLLED SUBSTANCE TRACKING 6/18/2019   Last Stephanie 6/18/2019   Report Number [id number]   Last UDS 6/18/2019   ORT Initial Risk Score 8   Pill count Expected   Diversion Concern No   Disposal Education and Agreement [id number]   Chronic Risk Low       UDS:  THC, Screen, Urine   Date Value Ref Range Status   06/18/2019 Negative Negative Final     Phencyclidine (PCP), Urine   Date Value Ref Range Status   06/18/2019 Negative Negative Final     Cocaine Screen, Urine   Date Value Ref Range Status   06/18/2019 Negative Negative Final     Methamphetamine, Ur   Date Value Ref Range Status   06/18/2019 Negative Negative Final     Opiate Screen   Date Value Ref Range Status   06/18/2019 Negative Negative Final     Amphetamine Screen, Urine   Date Value Ref Range Status   06/18/2019 Negative Negative Final     Benzodiazepine Screen, Urine   Date Value Ref Range Status   06/18/2019 Negative Negative Final     Tricyclic Antidepressants Screen   Date Value Ref Range Status   06/18/2019 Positive (A) Negative Final     Methadone Screen, Urine   Date Value Ref Range Status   06/18/2019 Negative Negative Final     Barbiturates Screen, Urine   Date Value Ref Range Status   06/18/2019 Negative Negative Final     Oxycodone Screen, Urine   Date Value Ref Range Status   06/18/2019 Negative Negative Final     Propoxyphene Screen   Date Value Ref Range Status   06/18/2019 Negative Negative Final     Buprenorphine, Screen, Urine   Date Value Ref Range Status   06/18/2019 Negative Negative Final     Palliative Performance Scale  Palliative Performance Scale Score: 90%    [place] Symptom Assessment System Revised  Pain Score: 5   ESAS Tiredness Score: 4  ESAS Nausea Score:  No nausea  ESAS Depression Score: 4  ESAS Anxiety Score: 8  ESAS Drowsiness Score: No drowsiness  ESAS Lack of Appetite Score: 5  ESAS Wellbeing Score: 4  ESAS Dyspnea Score: No shortness of breath  ESAS Other Problem Score: 6(weight gain)  ESAS Source of Information: patient    PINKY-7:    Over the last two weeks, how often have you been bothered by the following problems?  Feeling nervous, anxious or on edge: Nearly every day  Not being able to stop or control worrying: More than half the days  Worrying too much about different things: Nearly every day  Trouble Relaxing: Nearly every day  Being so restless that it is hard to sit still: Nearly every day  Becoming easily annoyed or irritable: Several days  Feeling afraid as if something awful might happen: More than half the days  PINKY 7 Total Score: 17    PHQ-9:  PHQ-2/PHQ-9 Depression Screening 6/18/2019   Little interest or pleasure in doing things 0   Feeling down, depressed, or hopeless 1   Trouble falling or staying asleep, or sleeping too much 0   Feeling tired or having little energy 2   Poor appetite or overeating 1   Feeling bad about yourself - or that you are a failure or have let yourself or your family down 1   Trouble concentrating on things, such as reading the newspaper or watching television 1   Moving or speaking so slowly that other people could have noticed. Or the opposite - being so fidgety or restless that you have been moving around a lot more than usual 2   Thoughts that you would be better off dead, or of hurting yourself in some way 0   Total Score 8   If you checked off any problems, how difficult have these problems made it for you to do your work, take care of things at home, or get along with other people? Very difficult        ECOG: (1) Restricted in physically strenuous activity, ambulatory and able to do work of light nature    Objective   Physical Exam   Constitutional: She is oriented to person, place, and time. She appears  well-developed and well-nourished. No distress.   Eyes: EOM are normal. Pupils are equal, round, and reactive to light. No scleral icterus.   Cardiovascular: Normal rate, regular rhythm and normal heart sounds. Exam reveals no gallop and no friction rub.   No murmur heard.  Pulmonary/Chest: Effort normal and breath sounds normal. No stridor. No respiratory distress. She has no wheezes. She has no rales.   Abdominal: Soft. Bowel sounds are normal. She exhibits no distension. There is no tenderness.   Genitourinary:   Genitourinary Comments: External hemorrhoids, not engorged, with redness.  Surrounding redness without fluctuance and with superficial tenderness   Musculoskeletal: She exhibits no edema, tenderness or deformity.   Neurological: She is alert and oriented to person, place, and time. She exhibits normal muscle tone. Coordination normal.   Skin: Skin is warm and dry. She is not diaphoretic.   Psychiatric: Her speech is normal and behavior is normal. Judgment and thought content normal. Her mood appears anxious. Cognition and memory are normal.   Nursing note and vitals reviewed.        Assessment/Plan   Cony was seen today for appointment and colon cancer.    Diagnoses and all orders for this visit:    Inflamed external hemorrhoid  -     Ambulatory Referral to Colorectal Surgery    Functional diarrhea    Generalized anxiety disorder    Other orders  -     nitroglycerin (NITRO-BID) 2 % ointment; Apply 0.5 inch to rectum twice daily as needed for enlarged painful hemorrhoids  -     hydrocortisone (ANUSOL-HC) 2.5 % rectal cream; Insert  into the rectum 2 (Two) Times a Day.             Prior authorization documentation:  Inefficacious:  n/a  Intolerant to side effects: n/a    Universal precautions:    ORT risk:  High  Aberrant behavior:  None.  UDT without opiates.  Negative for benzodiazepine but high rate false negative with clonazepam.  I am not going to assume management, so will not send for MS for  7-aminoclonazepam metabolit  Indication:  None, no current opioid Rx  OME:  0  Naloxone prescribed:  No     Advance care plan:    -  Healthcare decision making plan:   -  MOST discussions thus far:  Not needed today.  LCSW has assessed decision making plan.  See her note.      Patient Instructions of AVS:  1.  Address cognitive piece and diet modifications first (main treatment)  2.  Hemorrhoids to be addressed by colorectal surgeon  3.  Can consider pelvic floor physical therapy in future (ask around your local physical therapy offices) if rectal spasm pain persists after hemorrhoidectomy  4.  Consider ganglion impar block or other with Dr. Valiente at [facility] or Dr. Ferrari here in [facility] if rectal pain persists after hemorrhoidectomy  5.  Use your Lomotil to decrease frequency of bowel movements.    6.  Trial OTC Zantac twice daily for 1 month to see if acid BMs lessen overall.  7.  Pepto-Bismol as needed every day to decrease acidity of BM.      Total face to face time spent:  60.  Greater than 50% time spent in counseling and discussion re:  Non-pharmacologic and pharmacologic management of symptoms of diarrhea and rectal pain, step-wise approach and strategy of all tools discussed today.    Discussed topical nitroglycerin to add to hemorrhoid management for very large throbbing painful hemorrhoids.  Advised that patient remain supine after use due to theoretical hypotension.    Given [facility] pamphlet education re: Mindfulness and cautions of opioid therapy.    Advised of long-term risks of withdrawal pain and dependence with continued use of Norco, as well as risk of accidental overdose with concurrent benzodiazepine.  She has run out of Norco (was given last short fill 5/24/19, prior Rx 12/31/18).  I recommended against refill.  Advised to use the Lomotil to slow down frequency of BM instead.    Re: chronic benzodiazepine, encouraged follow up with her behavioral health  "counselor and primary care locally.  Anticipate cognitive therapy and mindfulness training as appropriate from her counselor.    Reviewed role of time and adjustment to her \"new normal\".    Care coordination:   -  Refer to [facility] for hemorrhoid definitive management.  -  Recently established at [facility] in [place], [place] with counselor Giulia.  -  She sees oncology after this appt.  -  Follow up in 3 months with oncology f/u appt.        "

## 2019-06-20 ENCOUNTER — TELEPHONE (OUTPATIENT)
Dept: PALLIATIVE CARE | Facility: CLINIC | Age: 74
End: 2019-06-20

## 2019-06-20 DIAGNOSIS — K62.89 RECTAL PAIN, CHRONIC: Primary | ICD-10-CM

## 2019-06-20 DIAGNOSIS — G89.29 RECTAL PAIN, CHRONIC: Primary | ICD-10-CM

## 2019-06-20 LAB — CGA SERPL-SCNC: 3 NMOL/L (ref 0–5)

## 2019-06-20 NOTE — TELEPHONE ENCOUNTER
Received message from Dr. Rodriguez's office that pt had refused appointment. Dr. Chawla requested I call pt and ask if she would want a nerve block. Call and spoke with Cony and she stated it was something she was interested in and would like for us to make the referral to Dr. Valiente in [place].  Message sent to Dr. Chawla to inform.

## 2019-06-21 ENCOUNTER — TELEPHONE (OUTPATIENT)
Dept: ONCOLOGY | Facility: CLINIC | Age: 74
End: 2019-06-21

## 2019-06-21 LAB — SEROTONIN PLAS-MCNC: 10 NG/ML (ref 0–420)

## 2019-06-21 NOTE — TELEPHONE ENCOUNTER
Patient not available. Spoke with her . Reviewed labs were WNL. We will see her at her follow up appointment with Dr. Bryan 7/12/2019.

## 2019-07-12 ENCOUNTER — APPOINTMENT (OUTPATIENT)
Dept: LAB | Facility: HOSPITAL | Age: 74
End: 2019-07-12

## 2019-07-12 ENCOUNTER — OFFICE VISIT (OUTPATIENT)
Dept: ONCOLOGY | Facility: CLINIC | Age: 74
End: 2019-07-12

## 2019-07-12 VITALS
WEIGHT: 124 LBS | BODY MASS INDEX: 21.97 KG/M2 | OXYGEN SATURATION: 100 % | RESPIRATION RATE: 14 BRPM | SYSTOLIC BLOOD PRESSURE: 127 MMHG | DIASTOLIC BLOOD PRESSURE: 48 MMHG | HEART RATE: 65 BPM | TEMPERATURE: 98.3 F | HEIGHT: 63 IN

## 2019-07-12 DIAGNOSIS — C7A.00 MALIGNANT CARCINOID TUMOR (HCC): ICD-10-CM

## 2019-07-12 DIAGNOSIS — D37.3 LOW GRADE MUCINOUS NEOPLASM OF APPENDIX: Primary | Chronic | ICD-10-CM

## 2019-07-12 PROCEDURE — 83497 ASSAY OF 5-HIAA: CPT | Performed by: NURSE PRACTITIONER

## 2019-07-12 PROCEDURE — 81050 URINALYSIS VOLUME MEASURE: CPT | Performed by: NURSE PRACTITIONER

## 2019-07-12 PROCEDURE — 99213 OFFICE O/P EST LOW 20 MIN: CPT | Performed by: INTERNAL MEDICINE

## 2019-07-12 NOTE — PROGRESS NOTES
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    Problem List:  Oncology/Hematology History    1. 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  Laparoscopic resection of appendix showed 4 cm low grade appendiceal mucinous neoplasm well-differentiated with invasion into the muscularis propria into the subserosa focally extending to the serosal surface making this a pathological T4.  Margins were all negative.  No lymphovascular invasion.  Nodes not sampled.  In the same specimen at the base of the appendix was a 5 mm grade 1 well-differentiated carcinoid with the base of the appendix margin positive.  No perineural or lymph vascular invasion.  Subsequently presented to Dr. Dante Rodriguez and then to our multidisciplinary complex GI conference.  The consensus was to scan her thoroughly preferably with a gallium 68 Dotetate scan if possible and then do a right hemicolectomy and then consider adjuvant therapy based on the pathologic staging at that junction.  I saw for the first time on 11/20/18.  Low-grade mucinous neoplasms of the appendix with the potential of this having perforated does have a high risk of peritoneal spread and adjuvant chemotherapy as of dubious benefit.  Appendiceal carcinoids generally are coincidentally found an rarely spread to the liver.  Nonetheless I would like to get gallium-68 Dotetate PET and we'll check her chromogranin A, serotonin, and 24-hour urine 5-HIAA.  Assuming that's negative then I would proceed with a right hemicolectomy given the positive margin at the base of the appendectomy and I would make adjuvant decisions based on what Dr. Rodriguez found intraoperatively and on  pathologic staging.  I will also would add parenthetically that low-grade mucinous carcinomas are generally chemotherapy insensitive and she may prefer to have her surgery done in a place where were they to stumble upon peritoneal involvement of this process that she could do Hipec.  Her daughter lives in [place] and they have the capability at [facility].  With reference to the carcinoid component of this, generally the 5-FU based adjuvant chemotherapy we would give for colon cancer is not helpful for that either but would usually involve tyrosine kinase inhibitors or Temodar and or Avastin but that is not necessarily in an adjuvant setting.  Hence, based on the potential complexity of surgery she was subsequently sent to Dr. Hunter at [facility] who has familiarity with Hipec.    -11/27/2018 PET negative    -12/5/2018 gallium-68 Dotetate PET negative    -12/17/2018 colonoscopy showed hyperplastic polyp 12 mm in the sigmoid colon    -12/18/2018 robotic right hemicolectomy with small bowel resection by Dr. Hunter was performed.  Pathology showed no residual goblet cell carcinoid.  Margins negative for malignancy and no disease in 15 lymph nodes sampled.  Perioperative CT scans showed no distant metastases according to Dr. Hunter's note.    -2/12/2019: Followed up with Dr. Hunter.  He recommended follow-up surveillance with me and to see if survivorship at .     -3/7/2019 office visit: Saw me for the first time postoperatively.  Reviewed the above data with the patient.  While with the goblet cell carcinoid there is a higher risk of recurrence, there is no adjuvant data to suggest treatment at this junction.  She is now nearly 3 months out postoperatively. There is no indication for postoperative treatment. NCCN guidelines suggests biochemical markers and scanning only as clinically indicated with tumors less than 2 cm and this was a 0.5 mm tumor.  For the low-grade mucinous neoplasm of the appendix, no routine  serologic or scanning is recommended in the absence of symptoms.  There was no hint of peritoneal involvement according to Dr. Hunter.  Nonetheless, she continues to lose weight with a poor appetite and there is not been a postoperative scan done yet so I will established at baseline now and get her to my nutritionists to work with her diet and have her see my nurse practitioner back in a few weeks to make sure her weight has stabilized and that the scan showed no sinister evidence of metastasis.  The weight loss is almost assuredly decreased intake trying to deal with her postoperative diarrhea.  Though I would not routinely do chromogranin A, serotonin, and urine 5-HIAA, with her diarrhea I will check that one time though I think that this is almost assuredly postoperative in nature.  If all of this is negative then I would just see her quarterly with my nurse practitioner to make sure she is feeling well and may be due a blood count and chemistry but beyond that would not do routine CAT scans and serum tumor marker testing in the absence of symptoms.  We will get her with palliative care to assist with the diarrhea and with our nutritionist to help with her weight loss which is due to decreased oral intake.    -3/21/2019 office visit: CT of abdomen and pelvis Impression: Diffuse abnormal wall thickening identified of the colon. Findings suggestive of a diffuse colitis.. Several stones seen within the gallbladder. No definite evidence of metastatic or recurrent disease. Chromogranin A, serotonin, and urine 5-HIAA from 3/7/2019 were all normal. She will see palliative care 4/23/2019 and I am making a referral to GI in [place] for assistance with the diffuse colitis. Patient wants GI referral closer to her home.    -7/12/2019 office visit:-5/23/2019 CT abdomen pelvis [facility] showed postoperative changes.  Chromogranin A was normal with serum serotonin normal and CMP unremarkable with normal CBC.   Had seen Dr. Hunter since last we saw her for her rectal pain which is due to hemorrhoids that he is sending her to a rectal surgeon to get that removed.  As he was arranging that I did not interfere.  From our standpoint she will get the 24-hour urine 5-HIAA results she dropped off today from the computer and will see us back in 6 months with repeat testing of serum and urine and CAT scans prior to return.          Low grade mucinous neoplasm of appendix    2018 Initial Diagnosis     Low grade mucinous neoplasm of appendix         3/14/2019 Imaging     CT of abdomen and pelvis IMPRESSION: Diffuse abnormal wall thickening identified of the colon. Findings suggestive of a diffuse colitis.. Several stones seen within the gallbladder. No definite evidence of metastatic or recurrent disease.          2019 Imaging     CT abdomen pelvis [facility] showed postoperative changes.  Chromogranin A was normal with serum serotonin normal and CMP unremarkable with normal CBC.            HISTORY OF PRESENT ILLNESS:  The patient is a 73 y.o. female, here for follow up on management of low-grade mucinous neoplasm and goblet cell carcinoid.  Apparently has thrombosed hemorrhoid diagnosed by Dr. Hunter and is feeling better now.      History reviewed. No pertinent past medical history.  Past Surgical History:   Procedure Laterality Date   • APPENDECTOMY     •  SECTION     • OOPHORECTOMY Right        Allergies   Allergen Reactions   • Cefuroxime Rash   • Sulfa Antibiotics Rash       Family History and Social History reviewed and changed as necessary      REVIEW OF SYSTEM:   Review of Systems   Constitutional: Negative for appetite change, chills, diaphoresis, fatigue, fever and unexpected weight change.   HENT:   Negative for mouth sores, sore throat and trouble swallowing.    Eyes: Negative for icterus.   Respiratory: Negative for cough, hemoptysis and shortness of breath.    Cardiovascular: Negative  "for chest pain, leg swelling and palpitations.   Gastrointestinal: Negative for abdominal distention, abdominal pain, blood in stool, constipation, diarrhea, nausea and vomiting.   Endocrine: Negative for hot flashes.   Genitourinary: Negative for bladder incontinence, difficulty urinating, dysuria, frequency and hematuria.    Musculoskeletal: Negative for gait problem, neck pain and neck stiffness.   Skin: Negative for rash.   Neurological: Negative for dizziness, gait problem, headaches, light-headedness and numbness.   Hematological: Negative for adenopathy. Does not bruise/bleed easily.   Psychiatric/Behavioral: Negative for depression. The patient is not nervous/anxious.    All other systems reviewed and are negative.       PHYSICAL EXAM    Vitals:    07/12/19 1517   BP: 127/48   Pulse: 65   Resp: 14   Temp: 98.3 °F (36.8 °C)   SpO2: 100%   Weight: 56.2 kg (124 lb)   Height: 158.8 cm (62.5\")     Constitutional: Appears well-developed and well-nourished. No distress.   ECOG: (0) Fully active, able to carry on all predisease performance without restriction  HENT:   Head: Normocephalic.   Mouth/Throat: Oropharynx is clear and moist.   Eyes: Conjunctivae are normal. Pupils are equal, round, and reactive to light. No scleral icterus.   Neck: Neck supple. No JVD present. No thyromegaly present.   Cardiovascular: Normal rate, regular rhythm and normal heart sounds.    Pulmonary/Chest: Breath sounds normal. No respiratory distress.   Abdominal: Soft. Exhibits no distension and no mass. There is no hepatosplenomegaly. There is no tenderness. There is no rebound and no guarding.   Musculoskeletal:Exhibits no edema, tenderness or deformity.   Neurological: Alert and oriented to person, place, and time. Exhibits normal muscle tone.   Skin: No ecchymosis, no petechiae and no rash noted. Not diaphoretic. No cyanosis. Nails show no clubbing.   Psychiatric: Normal mood and affect.   Vitals reviewed.      Lab Results "   Component Value Date    HGB 12.7 06/18/2019    HCT 36.9 06/18/2019    MCV 91.2 06/18/2019     06/18/2019    WBC 9.00 06/18/2019    NEUTROABS 5.40 06/18/2019    LYMPHSABS 3.10 06/18/2019    MONOSABS 0.50 06/18/2019    EOSABS 0.35 (H) 11/27/2018    BASOSABS 0.03 11/27/2018       Lab Results   Component Value Date    GLUCOSE 87 06/18/2019    BUN 22 06/18/2019    CREATININE 0.78 06/18/2019     06/18/2019    K 4.8 06/18/2019     06/18/2019    CO2 24.0 06/18/2019    CALCIUM 9.8 06/18/2019    PROTEINTOT 7.7 06/18/2019    ALBUMIN 4.60 06/18/2019    BILITOT 1.2 06/18/2019    ALKPHOS 58 06/18/2019    AST 22 06/18/2019    ALT 17 06/18/2019                   ASSESSMENT & PLAN:    1. 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  Chromogranin A was normal with serum serotonin normal and CMP unremarkable with normal CBC.  Had seen Dr. Hunter since last we saw her for her rectal pain which is due to hemorrhoids that he is sending her to a rectal surgeon to get that removed.  As he was arranging that I did not interfere.  From our standpoint she will get the 24-hour urine 5-HIAA results she dropped off today from the computer and will see us back in 6 months with repeat testing of serum and urine and CAT scans prior to return.        Matheus Bryan MD    07/12/2019

## 2019-07-16 LAB
5OH-INDOLEACETATE 24H UR-MCNC: 7.1 MG/L
5OH-INDOLEACETATE 24H UR-MRATE: 3.6 MG/24 HR (ref 0–14.9)

## 2020-01-09 ENCOUNTER — RESULTS ENCOUNTER (OUTPATIENT)
Dept: ONCOLOGY | Facility: CLINIC | Age: 75
End: 2020-01-09

## 2020-01-09 DIAGNOSIS — D37.3 LOW GRADE MUCINOUS NEOPLASM OF APPENDIX: Chronic | ICD-10-CM

## 2020-01-13 ENCOUNTER — TELEPHONE (OUTPATIENT)
Dept: ONCOLOGY | Facility: CLINIC | Age: 75
End: 2020-01-13

## 2020-01-13 NOTE — TELEPHONE ENCOUNTER
Pt called because pt had a lot of tests done recently but can't access the results in [employer]. She would like a call with her results.     Please call pt at [phone]

## 2020-01-14 NOTE — TELEPHONE ENCOUNTER
CT reviewed.  Chromogranin not elevated.  No urgent findings.  Needs to just keep appointment with Liliya on 1/20/2020 and will discuss then

## 2020-01-20 ENCOUNTER — OFFICE VISIT (OUTPATIENT)
Dept: ONCOLOGY | Facility: CLINIC | Age: 75
End: 2020-01-20

## 2020-01-20 VITALS
OXYGEN SATURATION: 100 % | RESPIRATION RATE: 12 BRPM | SYSTOLIC BLOOD PRESSURE: 187 MMHG | HEIGHT: 63 IN | DIASTOLIC BLOOD PRESSURE: 73 MMHG | TEMPERATURE: 97.4 F | HEART RATE: 59 BPM | WEIGHT: 130 LBS | BODY MASS INDEX: 23.04 KG/M2

## 2020-01-20 DIAGNOSIS — D37.3 LOW GRADE MUCINOUS NEOPLASM OF APPENDIX: Primary | Chronic | ICD-10-CM

## 2020-01-20 DIAGNOSIS — C7A.020 MALIGNANT CARCINOID TUMOR OF APPENDIX (HCC): ICD-10-CM

## 2020-01-20 DIAGNOSIS — C7A.8 OTHER MALIGNANT NEUROENDOCRINE TUMORS (HCC): ICD-10-CM

## 2020-01-20 PROCEDURE — 99214 OFFICE O/P EST MOD 30 MIN: CPT | Performed by: NURSE PRACTITIONER

## 2020-01-20 NOTE — PROGRESS NOTES
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    Problem List:  Oncology/Hematology History    1. 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  Laparoscopic resection of appendix showed 4 cm low grade appendiceal mucinous neoplasm well-differentiated with invasion into the muscularis propria into the subserosa focally extending to the serosal surface making this a pathological T4.  Margins were all negative.  No lymphovascular invasion.  Nodes not sampled.  In the same specimen at the base of the appendix was a 5 mm grade 1 well-differentiated carcinoid with the base of the appendix margin positive.  No perineural or lymph vascular invasion.  Subsequently presented to Dr. Dante Rodriguez and then to our multidisciplinary complex GI conference.  The consensus was to scan her thoroughly preferably with a gallium 68 Dotetate scan if possible and then do a right hemicolectomy and then consider adjuvant therapy based on the pathologic staging at that junction.  I saw for the first time on 11/20/18.  Low-grade mucinous neoplasms of the appendix with the potential of this having perforated does have a high risk of peritoneal spread and adjuvant chemotherapy as of dubious benefit.  Appendiceal carcinoids generally are coincidentally found an rarely spread to the liver.  Nonetheless I would like to get gallium-68 Dotetate PET and we'll check her chromogranin A, serotonin, and 24-hour urine 5-HIAA.  Assuming that's negative then I would proceed with a right hemicolectomy given the positive margin at the base of the appendectomy and I would make adjuvant decisions based on what Dr. Rodriguez found intraoperatively and on  pathologic staging.  I will also would add parenthetically that low-grade mucinous carcinomas are generally chemotherapy insensitive and she may prefer to have her surgery done in a place where were they to stumble upon peritoneal involvement of this process that she could do Hipec.  Her daughter lives in [place] and they have the capability at [facility].  With reference to the carcinoid component of this, generally the 5-FU based adjuvant chemotherapy we would give for colon cancer is not helpful for that either but would usually involve tyrosine kinase inhibitors or Temodar and or Avastin but that is not necessarily in an adjuvant setting.  Hence, based on the potential complexity of surgery she was subsequently sent to Dr. Hunter at [facility] who has familiarity with Hipec.    -11/27/2018 PET negative    -12/5/2018 gallium-68 Dotetate PET negative    -12/17/2018 colonoscopy showed hyperplastic polyp 12 mm in the sigmoid colon    -12/18/2018 robotic right hemicolectomy with small bowel resection by Dr. Hunter was performed.  Pathology showed no residual goblet cell carcinoid.  Margins negative for malignancy and no disease in 15 lymph nodes sampled.  Perioperative CT scans showed no distant metastases according to Dr. Hunter's note.    -2/12/2019: Followed up with Dr. Hunter.  He recommended follow-up surveillance with me and to see if survivorship at .     -3/7/2019 office visit: Saw me for the first time postoperatively.  Reviewed the above data with the patient.  While with the goblet cell carcinoid there is a higher risk of recurrence, there is no adjuvant data to suggest treatment at this junction.  She is now nearly 3 months out postoperatively. There is no indication for postoperative treatment. NCCN guidelines suggests biochemical markers and scanning only as clinically indicated with tumors less than 2 cm and this was a 0.5 mm tumor.  For the low-grade mucinous neoplasm of the appendix, no routine  serologic or scanning is recommended in the absence of symptoms.  There was no hint of peritoneal involvement according to Dr. Hunter.  Nonetheless, she continues to lose weight with a poor appetite and there is not been a postoperative scan done yet so I will established at baseline now and get her to my nutritionists to work with her diet and have her see my nurse practitioner back in a few weeks to make sure her weight has stabilized and that the scan showed no sinister evidence of metastasis.  The weight loss is almost assuredly decreased intake trying to deal with her postoperative diarrhea.  Though I would not routinely do chromogranin A, serotonin, and urine 5-HIAA, with her diarrhea I will check that one time though I think that this is almost assuredly postoperative in nature.  If all of this is negative then I would just see her quarterly with my nurse practitioner to make sure she is feeling well and may be due a blood count and chemistry but beyond that would not do routine CAT scans and serum tumor marker testing in the absence of symptoms.  We will get her with palliative care to assist with the diarrhea and with our nutritionist to help with her weight loss which is due to decreased oral intake.    -3/21/2019 office visit: CT of abdomen and pelvis Impression: Diffuse abnormal wall thickening identified of the colon. Findings suggestive of a diffuse colitis.. Several stones seen within the gallbladder. No definite evidence of metastatic or recurrent disease. Chromogranin A, serotonin, and urine 5-HIAA from 3/7/2019 were all normal. She will see palliative care 4/23/2019 and I am making a referral to GI in [place] for assistance with the diffuse colitis. Patient wants GI referral closer to her home.    -7/12/2019 office visit:-5/23/2019 CT abdomen pelvis [facility] showed postoperative changes.  Chromogranin A was normal with serum serotonin normal and CMP unremarkable with normal CBC.   Had seen Dr. Hunter since last we saw her for her rectal pain which is due to hemorrhoids that he is sending her to a rectal surgeon to get that removed.  As he was arranging that I did not interfere.  From our standpoint she will get the 24-hour urine 5-HIAA results she dropped off today from the computer and will see us back in 6 months with repeat testing of serum and urine and CAT scans prior to return.          Low grade mucinous neoplasm of appendix    2018 Initial Diagnosis     Low grade mucinous neoplasm of appendix      3/14/2019 Imaging     CT of abdomen and pelvis IMPRESSION: Diffuse abnormal wall thickening identified of the colon. Findings suggestive of a diffuse colitis.. Several stones seen within the gallbladder. No definite evidence of metastatic or recurrent disease.       2019 Imaging     CT abdomen pelvis [facility] showed postoperative changes.  Chromogranin A was normal with serum serotonin normal and CMP unremarkable with normal CBC.         HISTORY OF PRESENT ILLNESS:  The patient is a 74 y.o. female, here for follow up on management of low-grade mucinous neoplasm and goblet cell carcinoid.  Over all she is feeling well. At times if she eats fruit or increased fiber she has more frequent loose stools and abdominal cramping that improves with imodium and bentyl. Her B/P has been elevated recently and she is keeping a log to review with her PCP.     History reviewed. No pertinent past medical history.  Past Surgical History:   Procedure Laterality Date   • APPENDECTOMY     •  SECTION     • OOPHORECTOMY Right        Allergies   Allergen Reactions   • Cefuroxime Rash   • Sulfa Antibiotics Rash       Family History and Social History reviewed and changed as necessary      REVIEW OF SYSTEM:   Review of Systems   Constitutional: Negative for appetite change, chills, diaphoresis, fatigue, fever and unexpected weight change.   HENT:   Negative for mouth sores,  "sore throat and trouble swallowing.    Eyes: Negative for icterus.   Respiratory: Negative for cough, hemoptysis and shortness of breath.    Cardiovascular: Negative for chest pain, leg swelling and palpitations.   Gastrointestinal: Negative for abdominal distention, abdominal pain, blood in stool, constipation, diarrhea, nausea and vomiting.   Endocrine: Negative for hot flashes.   Genitourinary: Negative for bladder incontinence, difficulty urinating, dysuria, frequency and hematuria.    Musculoskeletal: Negative for gait problem, neck pain and neck stiffness.   Skin: Negative for rash.   Neurological: Negative for dizziness, gait problem, headaches, light-headedness and numbness.   Hematological: Negative for adenopathy. Does not bruise/bleed easily.   Psychiatric/Behavioral: Negative for depression. The patient is not nervous/anxious.    All other systems reviewed and are negative.       PHYSICAL EXAM    Vitals:    01/20/20 1444   BP: (!) 187/73   Pulse: 59   Resp: 12   Temp: 97.4 °F (36.3 °C)   TempSrc: Temporal   SpO2: 100%   Weight: 59 kg (130 lb)   Height: 158.8 cm (62.5\")     Constitutional: Appears well-developed and well-nourished. No distress.   ECOG: (0) Fully active, able to carry on all predisease performance without restriction  HENT:   Head: Normocephalic.   Mouth/Throat: Oropharynx is clear and moist.   Eyes: Conjunctivae are normal. Pupils are equal, round, and reactive to light. No scleral icterus.   Neck: Neck supple. No JVD present. No thyromegaly present.   Cardiovascular: Normal rate, regular rhythm and normal heart sounds.    Pulmonary/Chest: Breath sounds normal. No respiratory distress.   Abdominal: Soft. Exhibits no distension and no mass. There is no hepatosplenomegaly. There is no tenderness. There is no rebound and no guarding.   Musculoskeletal:Exhibits no edema, tenderness or deformity.   Neurological: Alert and oriented to person, place, and time. Exhibits normal muscle tone. "   Skin: No ecchymosis, no petechiae and no rash noted. Not diaphoretic. No cyanosis. Nails show no clubbing.   Psychiatric: Normal mood and affect.   Vitals reviewed.      Lab Results   Component Value Date    HGB 12.7 06/18/2019    HCT 36.9 06/18/2019    MCV 91.2 06/18/2019     06/18/2019    WBC 9.00 06/18/2019    NEUTROABS 5.40 06/18/2019    LYMPHSABS 3.10 06/18/2019    MONOSABS 0.50 06/18/2019    EOSABS 0.35 (H) 11/27/2018    BASOSABS 0.03 11/27/2018       Lab Results   Component Value Date    GLUCOSE 87 06/18/2019    BUN 22 06/18/2019    CREATININE 0.78 06/18/2019     06/18/2019    K 4.8 06/18/2019     06/18/2019    CO2 24.0 06/18/2019    CALCIUM 9.8 06/18/2019    PROTEINTOT 7.7 06/18/2019    ALBUMIN 4.60 06/18/2019    BILITOT 1.2 06/18/2019    ALKPHOS 58 06/18/2019    AST 22 06/18/2019    ALT 17 06/18/2019                   ASSESSMENT & PLAN:    1. 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 0.5 cm T2 N0 M0 invading the muscularis propria stage I appendiceal goblet cell carcinoid     -01/20/2020 CT abdomen pelvis at [facility] showed postoperative changes along with mild thickening of the wall of the sigmoid colon and rectum.  Chromogranin A was normal  67.8, with serum serotonin normal 11 and CMP unremarkable with normal CBC. 24 hour urine 5HIAA also normal. We will obtain a  24-hour urine 5-HIAA, Chromogranin A, serum serotonin, CBC and CMP along with CT scan of abdomen and pelvis prior to return in 6 months. After reviewing CT scan results with Dr. Bryan, he would like to refer the patient back to gastroenterology at [facility] for follow up colonoscopy for evaluation. We will see her back in 6 months with scans and labs.        Shu Camargo, IVAN  01/20/2020

## 2020-01-23 ENCOUNTER — TELEPHONE (OUTPATIENT)
Dept: ONCOLOGY | Facility: CLINIC | Age: 75
End: 2020-01-23

## 2020-01-23 DIAGNOSIS — R93.5 ABNORMAL CT OF THE ABDOMEN: Primary | ICD-10-CM

## 2020-01-23 DIAGNOSIS — C7A.020 MALIGNANT CARCINOID TUMOR OF APPENDIX (HCC): ICD-10-CM

## 2020-01-23 DIAGNOSIS — D37.3 LOW GRADE MUCINOUS NEOPLASM OF APPENDIX: Chronic | ICD-10-CM

## 2020-01-23 NOTE — TELEPHONE ENCOUNTER
I received a call from Elsa LUGO at Dr. Alexander Hunter's office. Dr. Hunter has reviewed the ct films and wants patient  have a colonoscopy here at [facility] per one of our gastroenterologist to follow up on mild thickening of the wall of the sigmoid colon and rectum. Dr. Hunter has referred patient to a surgeon at  for hernia repair. Reviewed information with patient. Patient verbalizes understanding.

## 2020-03-05 RX ORDER — SODIUM, POTASSIUM,MAG SULFATES 17.5-3.13G
2 SOLUTION, RECONSTITUTED, ORAL ORAL TAKE AS DIRECTED
Qty: 354 ML | Refills: 0 | Status: SHIPPED | OUTPATIENT
Start: 2020-03-05 | End: 2022-07-14

## 2020-03-10 ENCOUNTER — LAB REQUISITION (OUTPATIENT)
Dept: LAB | Facility: HOSPITAL | Age: 75
End: 2020-03-10

## 2020-03-10 ENCOUNTER — OUTSIDE FACILITY SERVICE (OUTPATIENT)
Dept: GASTROENTEROLOGY | Facility: CLINIC | Age: 75
End: 2020-03-10

## 2020-03-10 DIAGNOSIS — R93.5 ABNORMAL FINDINGS ON DIAGNOSTIC IMAGING OF OTHER ABDOMINAL REGIONS, INCLUDING RETROPERITONEUM: ICD-10-CM

## 2020-03-10 PROCEDURE — 45385 COLONOSCOPY W/LESION REMOVAL: CPT | Performed by: INTERNAL MEDICINE

## 2020-03-10 PROCEDURE — 88305 TISSUE EXAM BY PATHOLOGIST: CPT | Performed by: INTERNAL MEDICINE

## 2020-03-10 PROCEDURE — 99204 OFFICE O/P NEW MOD 45 MIN: CPT | Performed by: INTERNAL MEDICINE

## 2020-03-10 PROCEDURE — 45380 COLONOSCOPY AND BIOPSY: CPT | Performed by: INTERNAL MEDICINE

## 2020-03-10 RX ORDER — MONTELUKAST SODIUM 4 MG/1
2 TABLET, CHEWABLE ORAL 2 TIMES DAILY
Qty: 120 TABLET | Refills: 11 | Status: SHIPPED | OUTPATIENT
Start: 2020-03-10

## 2020-03-11 LAB
CYTO UR: NORMAL
LAB AP CASE REPORT: NORMAL
LAB AP CLINICAL INFORMATION: NORMAL
PATH REPORT.FINAL DX SPEC: NORMAL
PATH REPORT.GROSS SPEC: NORMAL

## 2020-03-27 ENCOUNTER — TELEPHONE (OUTPATIENT)
Dept: GASTROENTEROLOGY | Facility: CLINIC | Age: 75
End: 2020-03-27

## 2020-04-17 ENCOUNTER — OFFICE VISIT (OUTPATIENT)
Dept: GASTROENTEROLOGY | Facility: CLINIC | Age: 75
End: 2020-04-17

## 2020-04-17 DIAGNOSIS — K59.1 FUNCTIONAL DIARRHEA: Primary | ICD-10-CM

## 2020-04-17 PROCEDURE — 99442 PR PHYS/QHP TELEPHONE EVALUATION 11-20 MIN: CPT | Performed by: NURSE PRACTITIONER

## 2020-04-17 RX ORDER — DICYCLOMINE HCL 20 MG
TABLET ORAL
COMMUNITY
Start: 2020-01-15

## 2020-04-17 NOTE — PROGRESS NOTES
GASTROENTEROLOGY OFFICE NOTE  Cony Hayden  [id number]  1945    CARE TEAM  Patient Care Team:  Elmer Marquez DO as PCP - General (Internal Medicine)  Sanchez Bethea MD as Consulting Physician (Family Medicine)  Shu Camargo APRN as Nurse Practitioner (Oncology)    You have chosen to receive care through a telephone visit. Do you consent to use a telephone visit for your medical care today? Yes      Referring Provider: Elmer Marquez DO    Chief Complaint   Patient presents with   • Follow-up     Post colonoscopy for abnormal CT scan of abdomen and personal history of appendiceal cancerr        HISTORY OF PRESENT ILLNESS:  Ms. Hayden is seen in follow up via telephone visit today status post colonoscopy for complaints of diarrhea and an abnormal CT scan of the abdomen which showed some thickening in sigmoid colon and rectum.  Colonoscopy revealed a 5 mm polyp in the sigmoid colon that proved to be hyperplastic, diverticulosis in the sigmoid and descending colon, and Ramos ileal anastomosis widely patent.  Random biopsies were taken to exclude microscopic colitis and were unremarkable.  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  She was started on Colestid 1 to 4 g daily, instructed to titrate to response and tolerance.  She has been taking Colestid 2 g daily and reports a significant improvement in her diarrhea.  She is still having 2-3 bowel movements daily that are more formed.  She continues to have some urgency at times.  Prior to initiation of Colestid she was having up to 10 bowel movements daily.    PAST MEDICAL HISTORY  Past Medical History:   Diagnosis Date   • Cancer (CMS/HCC) Appendix   • Colon polyp    • Diverticulitis of colon    • Fatty liver    • Hernia    • Hyperlipidemia    • Hypertension    • Irritable bowel syndrome         PAST  SURGICAL HISTORY  Past Surgical History:   Procedure Laterality Date   • ABDOMINAL SURGERY     • APPENDECTOMY     •  SECTION     • COLONOSCOPY     • OOPHORECTOMY Right         MEDICATIONS:    Current Outpatient Medications:   •  amitriptyline (ELAVIL) 10 MG tablet, Take 10 mg by mouth every night at bedtime., Disp: , Rfl: 2  •  clonazePAM (KlonoPIN) 1 MG tablet, Take 1 tablet by mouth 2 (Two) Times a Day As Needed., Disp: , Rfl: 1  •  colestipol (COLESTID) 1 g tablet, Take 2 tablets by mouth 2 (Two) Times a Day. Take separate 2 hours from other medications, Disp: 120 tablet, Rfl: 11  •  dicyclomine (BENTYL) 20 MG tablet, , Disp: , Rfl:   •  lisinopril (PRINIVIL,ZESTRIL) 2.5 MG tablet, Take 2.5 mg by mouth Daily., Disp: , Rfl:   •  metoprolol tartrate (LOPRESSOR) 25 MG tablet, Take 12.5 mg by mouth 2 (Two) Times a Day., Disp: , Rfl: 0  •  ondansetron (ZOFRAN) 4 MG tablet, Take 4 mg by mouth As Needed., Disp: , Rfl: 0  •  sertraline (ZOLOFT) 100 MG tablet, Take 100 mg by mouth Daily., Disp: , Rfl: 3  •  clonazePAM (KlonoPIN) 0.5 MG tablet, Take 0.5 mg by mouth 2 (Two) Times a Day As Needed., Disp: , Rfl: 0  •  hydrocortisone (ANUSOL-HC) 2.5 % rectal cream, Insert  into the rectum 2 (Two) Times a Day., Disp: 1 each, Rfl: 1  •  hydrocortisone (PROCTOCORT) 1 % cream rectal cream, , Disp: , Rfl:   •  LOPERAMIDE HCL PO, Take  by mouth As Needed., Disp: , Rfl:   •  sodium-potassium-magnesium sulfates (SUPREP BOWEL PREP KIT) 17.5-3.13-1.6 GM/177ML solution oral solution, Take 2 bottles by mouth Take As Directed. Do Not Eat The Day Before Your Procedure. Call [phone] for questions., Disp: 354 mL, Rfl: 0    ALLERGIES  Allergies   Allergen Reactions   • Cefuroxime Rash   • Sulfa Antibiotics Rash       FAMILY HISTORY:  Family History   Problem Relation Age of Onset   • Hypertension Mother    • Heart attack Father    • Lung cancer Father    • Cancer Father    • Irritable bowel syndrome Sister        SOCIAL  HISTORY  Social History     Socioeconomic History   • Marital status:      Spouse name: Not on file   • Number of children: Not on file   • Years of education: Not on file   • Highest education level: Not on file   Tobacco Use   • Smoking status: Never Smoker   • Smokeless tobacco: Never Used   Substance and Sexual Activity   • Alcohol use: No     Frequency: Never   • Drug use: No   • Sexual activity: Not Currently     Partners: Male     Birth control/protection: Post-menopausal       REVIEW OF SYSTEMS  Review of Systems   Constitutional: Negative for activity change, appetite change, chills, diaphoresis, fatigue, fever, unexpected weight gain and unexpected weight loss.   HENT: Negative for trouble swallowing and voice change.    Gastrointestinal: Positive for diarrhea. Negative for abdominal distention, abdominal pain, anal bleeding, blood in stool, constipation, nausea, rectal pain, vomiting, GERD and indigestion.     PHYSICAL EXAM   There were no vitals taken for this visit.  Physical Exam   Constitutional: She is oriented to person, place, and time.   Neurological: She is alert and oriented to person, place, and time.   Psychiatric: She has a normal mood and affect. Her behavior is normal. Thought content normal.     Results Review:  I have reviewed the patient's new clinical results.    ASSESSMENT / PLAN  1.  Functional diarrhea  -Continue Colestid 1 to 4 g daily.  Continue to titrate to effectiveness.  2.  Colon cancer screening  - Surveillance colonoscopy due in 5 years (March 2025)    Return if symptoms worsen or fail to improve.     I discussed the patients findings and my recommendations with patient     This visit has been rescheduled as a phone visit to comply with patient safety concerns in accordance with CDC recommendations. Total time of discussion was 16 minutes.    IVAN Winters

## 2020-06-26 ENCOUNTER — TELEPHONE (OUTPATIENT)
Dept: ONCOLOGY | Facility: CLINIC | Age: 75
End: 2020-06-26

## 2020-06-26 NOTE — TELEPHONE ENCOUNTER
PT NEEDS TO SCHEDULE HER CT SCAN TO BE DONE AT [facility] PER DR OCAMPO LAST VISIT. DOES SHE ALSO NEED TO HAVE A 24 HR URINE TEST.  HER NEXT APPT WITH DR OCAMPO IS ON 7/20/20.    PLEASE GIVE PT A CALL [phone].

## 2020-06-26 NOTE — TELEPHONE ENCOUNTER
Phone number given to call is not a working number [phone].  Called PT's mobile. PT states the number is [phone].  PT notified that she does have a 24 hr urine ordered along with other labs.  PT requests that the lab orders be mailed to her home address and I will do that today.   [phone]  ~Shell

## 2020-07-28 ENCOUNTER — TELEPHONE (OUTPATIENT)
Dept: ONCOLOGY | Facility: CLINIC | Age: 75
End: 2020-07-28

## 2020-07-28 NOTE — TELEPHONE ENCOUNTER
[phone] does not have a VM set up. [phone] PT notified that not all test results are back a this time. PT would like to keep appt on Thursday, but change it to a TV.  [id number]  ~Shell

## 2020-07-28 NOTE — TELEPHONE ENCOUNTER
patient has an appt on 07/30/20 with dr. Bryan and wants to know if this appt can be done over the telephone.      Patient wants to know if her test results have come back       Call patient back at [phone]

## 2020-07-30 ENCOUNTER — OFFICE VISIT (OUTPATIENT)
Dept: ONCOLOGY | Facility: CLINIC | Age: 75
End: 2020-07-30

## 2020-07-30 DIAGNOSIS — D37.3 LOW GRADE MUCINOUS NEOPLASM OF APPENDIX: Primary | Chronic | ICD-10-CM

## 2020-07-30 PROCEDURE — 99214 OFFICE O/P EST MOD 30 MIN: CPT | Performed by: INTERNAL MEDICINE

## 2020-07-30 NOTE — PROGRESS NOTES
Telehealth follow-up visit: This visit has been rescheduled as a phone visit to comply with patient safety concerns in accordance with CDC recommendations. Total time of discussion was 30 minutes.  Verbal consent given.      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 0.5 cm T2 N0 M0 invading the muscularis propria stage I appendiceal goblet cell carcinoid     -10/22/18 CT abdomen pelvis [facility] showed small bowel obstruction in the distal ileum with trace ascites.  Laparoscopic resection of appendix showed 4 cm low grade appendiceal mucinous neoplasm well-differentiated with invasion into the muscularis propria into the subserosa focally extending to the serosal surface making this a pathological T4.  Margins were all negative.  No lymphovascular invasion.  Nodes not sampled.  In the same specimen at the base of the appendix was a 5 mm grade 1 well-differentiated carcinoid with the base of the appendix margin positive.  No perineural or lymph vascular invasion.  Subsequently presented to Dr. Dante Rodriguez and then to our multidisciplinary complex GI conference.  The consensus was to scan her thoroughly preferably with a gallium 68 Dotetate scan if possible and then do a right hemicolectomy and then consider adjuvant therapy based on the pathologic staging at that junction.  I saw for the first time on 11/20/18.  Low-grade mucinous neoplasms of the appendix with the potential of this having perforated does have a high risk of peritoneal spread and adjuvant chemotherapy as of dubious benefit.  Appendiceal carcinoids generally are coincidentally found an rarely spread to the liver.  Nonetheless I would like to get gallium-68 Dotetate PET and we'll check her chromogranin A, serotonin, and 24-hour urine 5-HIAA.  Assuming that's negative  then I would proceed with a right hemicolectomy given the positive margin at the base of the appendectomy and I would make adjuvant decisions based on what Dr. Rodriguez found intraoperatively and on pathologic staging.  I will also would add parenthetically that low-grade mucinous carcinomas are generally chemotherapy insensitive and she may prefer to have her surgery done in a place where were they to stumble upon peritoneal involvement of this process that she could do Hipec.  Her daughter lives in [place] and they have the capability at [facility].  With reference to the carcinoid component of this, generally the 5-FU based adjuvant chemotherapy we would give for colon cancer is not helpful for that either but would usually involve tyrosine kinase inhibitors or Temodar and or Avastin but that is not necessarily in an adjuvant setting.  Hence, based on the potential complexity of surgery she was subsequently sent to Dr. Hunter at [facility] who has familiarity with Hipec.    -11/27/2018 PET negative    -12/5/2018 gallium-68 Dotetate PET negative    -12/17/2018 colonoscopy showed hyperplastic polyp 12 mm in the sigmoid colon    -12/18/2018 robotic right hemicolectomy with small bowel resection by Dr. Hunter was performed.  Pathology showed no residual goblet cell carcinoid.  Margins negative for malignancy and no disease in 15 lymph nodes sampled.  Perioperative CT scans showed no distant metastases according to Dr. Hunter's note.    -2/12/2019: Followed up with Dr. Hunter.  He recommended follow-up surveillance with me and to see if survivorship at .     -3/7/2019 office visit: Saw me for the first time postoperatively.  Reviewed the above data with the patient.  While with the goblet cell carcinoid there is a higher risk of recurrence, there is no adjuvant data to suggest treatment at this junction.  She is now nearly 3 months out postoperatively. There is no indication for postoperative treatment. NCCN guidelines  suggests biochemical markers and scanning only as clinically indicated with tumors less than 2 cm and this was a 0.5 mm tumor.  For the low-grade mucinous neoplasm of the appendix, no routine serologic or scanning is recommended in the absence of symptoms.  There was no hint of peritoneal involvement according to Dr. Hunter.  Nonetheless, she continues to lose weight with a poor appetite and there is not been a postoperative scan done yet so I will established at baseline now and get her to my nutritionists to work with her diet and have her see my nurse practitioner back in a few weeks to make sure her weight has stabilized and that the scan showed no sinister evidence of metastasis.  The weight loss is almost assuredly decreased intake trying to deal with her postoperative diarrhea.  Though I would not routinely do chromogranin A, serotonin, and urine 5-HIAA, with her diarrhea I will check that one time though I think that this is almost assuredly postoperative in nature.  If all of this is negative then I would just see her quarterly with my nurse practitioner to make sure she is feeling well and may be due a blood count and chemistry but beyond that would not do routine CAT scans and serum tumor marker testing in the absence of symptoms.  We will get her with palliative care to assist with the diarrhea and with our nutritionist to help with her weight loss which is due to decreased oral intake.    -3/21/2019 office visit: CT of abdomen and pelvis Impression: Diffuse abnormal wall thickening identified of the colon. Findings suggestive of a diffuse colitis.. Several stones seen within the gallbladder. No definite evidence of metastatic or recurrent disease. Chromogranin A, serotonin, and urine 5-HIAA from 3/7/2019 were all normal. She will see palliative care 4/23/2019 and I am making a referral to GI in [place] for assistance with the diffuse colitis. Patient wants GI referral closer to her home.    -7/12/2019  office visit:-5/23/2019 CT abdomen pelvis [facility] showed postoperative changes.  Chromogranin A was normal with serum serotonin normal and CMP unremarkable with normal CBC.  Had seen Dr. Hunter since last we saw her for her rectal pain which is due to hemorrhoids that he is sending her to a rectal surgeon to get that removed.  As he was arranging that I did not interfere.  From our standpoint she will get the 24-hour urine 5-HIAA results she dropped off today from the computer and will see us back in 6 months with repeat testing of serum and urine and CAT scans prior to return.    -3/10/2020 colonoscopy Dr. Lomeli showed diverticulosis.  Pathology showed no colitis.    -7/22/2020 CT abdomen pelvis [facility] showed diffuse wall thickening of the colon with possible low-grade colitis.  No free fluid.  Postsurgical appendectomy and ileocolic resection changes.  Periumbilical hernia with incarcerated small bowel without evidence of strangulation or obstruction.  24-hour urine 5-HIAA normal 2.2.  CMP unremarkable save for GFR 44.4 with creatinine 1.21.  Normal liver enzymes.  CBC normal.  They did not draw the serotonin or the chromogranin A as ordered.          Low grade mucinous neoplasm of appendix    11/20/2018 Initial Diagnosis     Low grade mucinous neoplasm of appendix      3/14/2019 Imaging     CT of abdomen and pelvis IMPRESSION: Diffuse abnormal wall thickening identified of the colon. Findings suggestive of a diffuse colitis.. Several stones seen within the gallbladder. No definite evidence of metastatic or recurrent disease.       5/23/2019 Imaging     CT abdomen pelvis [facility] showed postoperative changes.  Chromogranin A was normal with serum serotonin normal and CMP unremarkable with normal CBC.      7/22/2020 Imaging     -3/10/2020 colonoscopy Dr. Lomeli showed diverticulosis.  Pathology showed no colitis.  -7/22/2020 CT abdomen pelvis [facility] showed  diffuse wall thickening of the colon with possible low-grade colitis.  No free fluid.  Postsurgical appendectomy and ileocolic resection changes.  Periumbilical hernia with incarcerated small bowel without evidence of strangulation or obstruction.  24-hour urine 5-HIAA normal 2.2.  CMP unremarkable save for GFR 44.4 with creatinine 1.21.  Normal liver enzymes.  CBC normal.  They did not draw the serotonin or the chromogranin A as ordered.         HISTORY OF PRESENT ILLNESS:  The patient is a 74 y.o. 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      Past Medical History:   Diagnosis Date   • Cancer (CMS/HCC) Appendix   • Colon polyp    • Diverticulitis of colon    • Fatty liver    • Hernia    • Hyperlipidemia    • Hypertension    • Irritable bowel syndrome      Past Surgical History:   Procedure Laterality Date   • ABDOMINAL SURGERY     • APPENDECTOMY     •  SECTION     • COLONOSCOPY     • OOPHORECTOMY Right        Allergies   Allergen Reactions   • Cefuroxime Rash   • Sulfa Antibiotics Rash       Family History and Social History reviewed and changed as necessary      REVIEW OF SYSTEM:   Review of Systems   Constitutional: Negative for appetite change, chills, diaphoresis, fatigue, fever and unexpected weight change.   HENT:   Negative for mouth sores, sore throat and trouble swallowing.    Eyes: Negative for icterus.   Respiratory: Negative for cough, hemoptysis and shortness of breath.    Cardiovascular: Negative for chest pain, leg swelling and palpitations.   Gastrointestinal: Negative for abdominal distention, abdominal pain, blood in stool, constipation, diarrhea, nausea and vomiting.   Endocrine: Negative for hot flashes.   Genitourinary: Negative for bladder incontinence, difficulty urinating, dysuria, frequency and hematuria.    Musculoskeletal: Negative for gait problem, neck pain and neck stiffness.   Skin:  Negative for rash.   Neurological: Negative for dizziness, gait problem, headaches, light-headedness and numbness.   Hematological: Negative for adenopathy. Does not bruise/bleed easily.   Psychiatric/Behavioral: Negative for depression. The patient is not nervous/anxious.    All other systems reviewed and are negative.       PHYSICAL EXAM    There were no vitals filed for this visit.  There were no vitals filed for this visit.     Phone visit      Lab Results   Component Value Date    HGB 12.7 06/18/2019    HCT 36.9 06/18/2019    MCV 91.2 06/18/2019     06/18/2019    WBC 9.00 06/18/2019    NEUTROABS 5.40 06/18/2019    LYMPHSABS 3.10 06/18/2019    MONOSABS 0.50 06/18/2019    EOSABS 0.35 (H) 11/27/2018    BASOSABS 0.03 11/27/2018       Lab Results   Component Value Date    GLUCOSE 87 06/18/2019    BUN 22 06/18/2019    CREATININE 0.78 06/18/2019     06/18/2019    K 4.8 06/18/2019     06/18/2019    CO2 24.0 06/18/2019    CALCIUM 9.8 06/18/2019    PROTEINTOT 7.7 06/18/2019    ALBUMIN 4.60 06/18/2019    BILITOT 1.2 06/18/2019    ALKPHOS 58 06/18/2019    AST 22 06/18/2019    ALT 17 06/18/2019                   ASSESSMENT & PLAN:    1. 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 Incarcerated hernia    Discussion: No clear-cut evidence of recurrence on images I reviewed reports and images thereof CT abdomen pelvis.  There is some potential colitis.  Just had colonoscopy back in March without obvious colitis on biopsies.  She is due for repair of her incarcerated hernia early August.  Feeling fairly fit and no more weight loss, and no unexplained diarrhea, itching, flushing.  We will do a phone visit with her in 6 months and no further labs or scans except as symptoms arise discussed with patient 30 minutes greater than 50% counseling.  Pain score 0.  Depression score was 0.  Advance  care planning discussed and she does not wish to go into detail on that but feels great.      Matheus Bryan MD

## 2020-12-29 ENCOUNTER — TELEPHONE (OUTPATIENT)
Dept: ONCOLOGY | Facility: CLINIC | Age: 75
End: 2020-12-29

## 2020-12-29 NOTE — TELEPHONE ENCOUNTER
Caller: karen    Relationship to patient: self    Best call back number: [phone]    Pt states she has an order for a ct scan with her and is calling to to verify that it is okay to have her ct scan done at [facility].

## 2020-12-29 NOTE — TELEPHONE ENCOUNTER
I talked with IVAN Jimenez and she said it was okay for patient to have her scans done at [facility].  Patient verbalized understanding.

## 2021-01-28 ENCOUNTER — OFFICE VISIT (OUTPATIENT)
Dept: ONCOLOGY | Facility: CLINIC | Age: 76
End: 2021-01-28

## 2021-01-28 VITALS — BODY MASS INDEX: 25.03 KG/M2 | HEIGHT: 62 IN | WEIGHT: 136 LBS

## 2021-01-28 DIAGNOSIS — C7A.020 MALIGNANT CARCINOID TUMOR OF APPENDIX (HCC): Primary | ICD-10-CM

## 2021-01-28 PROCEDURE — 99442 PR PHYS/QHP TELEPHONE EVALUATION 11-20 MIN: CPT | Performed by: NURSE PRACTITIONER

## 2021-01-28 NOTE — PROGRESS NOTES
You have chosen to receive care through a telephone visit. Do you consent to use a telephone visit for your medical care today? 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  Laparoscopic resection of appendix showed 4 cm low grade appendiceal mucinous neoplasm well-differentiated with invasion into the muscularis propria into the subserosa focally extending to the serosal surface making this a pathological T4.  Margins were all negative.  No lymphovascular invasion.  Nodes not sampled.  In the same specimen at the base of the appendix was a 5 mm grade 1 well-differentiated carcinoid with the base of the appendix margin positive.  No perineural or lymph vascular invasion.  Subsequently presented to Dr. Dante Rodriguez and then to our multidisciplinary complex GI conference.  The consensus was to scan her thoroughly preferably with a gallium 68 Dotetate scan if possible and then do a right hemicolectomy and then consider adjuvant therapy based on the pathologic staging at that junction.  I saw for the first time on 11/20/18.  Low-grade mucinous neoplasms of the appendix with the potential of this having perforated does have a high risk of peritoneal spread and adjuvant chemotherapy as of dubious benefit.  Appendiceal carcinoids generally are coincidentally found an rarely spread to the liver.  Nonetheless I would like to get gallium-68 Dotetate PET and we'll check her chromogranin A, serotonin, and 24-hour urine 5-HIAA.  Assuming that's negative then I would proceed with a right hemicolectomy given the positive margin  at the base of the appendectomy and I would make adjuvant decisions based on what Dr. Rodriguez found intraoperatively and on pathologic staging.  I will also would add parenthetically that low-grade mucinous carcinomas are generally chemotherapy insensitive and she may prefer to have her surgery done in a place where were they to stumble upon peritoneal involvement of this process that she could do Hipec.  Her daughter lives in [place] and they have the capability at [facility].  With reference to the carcinoid component of this, generally the 5-FU based adjuvant chemotherapy we would give for colon cancer is not helpful for that either but would usually involve tyrosine kinase inhibitors or Temodar and or Avastin but that is not necessarily in an adjuvant setting.  Hence, based on the potential complexity of surgery she was subsequently sent to Dr. Hunter at [facility] who has familiarity with Hipec.    -11/27/2018 PET negative    -12/5/2018 gallium-68 Dotetate PET negative    -12/17/2018 colonoscopy showed hyperplastic polyp 12 mm in the sigmoid colon    -12/18/2018 robotic right hemicolectomy with small bowel resection by Dr. Hunter was performed.  Pathology showed no residual goblet cell carcinoid.  Margins negative for malignancy and no disease in 15 lymph nodes sampled.  Perioperative CT scans showed no distant metastases according to Dr. uHnter's note.    -2/12/2019: Followed up with Dr. Hunter.  He recommended follow-up surveillance with me and to see if survivorship at .     -3/7/2019 office visit: Saw me for the first time postoperatively.  Reviewed the above data with the patient.  While with the goblet cell carcinoid there is a higher risk of recurrence, there is no adjuvant data to suggest treatment at this junction.  She is now nearly 3 months out postoperatively. There is no indication for postoperative treatment. NCCN guidelines suggests biochemical markers and scanning only as clinically indicated  with tumors less than 2 cm and this was a 0.5 mm tumor.  For the low-grade mucinous neoplasm of the appendix, no routine serologic or scanning is recommended in the absence of symptoms.  There was no hint of peritoneal involvement according to Dr. Hunter.  Nonetheless, she continues to lose weight with a poor appetite and there is not been a postoperative scan done yet so I will established at baseline now and get her to my nutritionists to work with her diet and have her see my nurse practitioner back in a few weeks to make sure her weight has stabilized and that the scan showed no sinister evidence of metastasis.  The weight loss is almost assuredly decreased intake trying to deal with her postoperative diarrhea.  Though I would not routinely do chromogranin A, serotonin, and urine 5-HIAA, with her diarrhea I will check that one time though I think that this is almost assuredly postoperative in nature.  If all of this is negative then I would just see her quarterly with my nurse practitioner to make sure she is feeling well and may be due a blood count and chemistry but beyond that would not do routine CAT scans and serum tumor marker testing in the absence of symptoms.  We will get her with palliative care to assist with the diarrhea and with our nutritionist to help with her weight loss which is due to decreased oral intake.    -3/21/2019 office visit: CT of abdomen and pelvis Impression: Diffuse abnormal wall thickening identified of the colon. Findings suggestive of a diffuse colitis.. Several stones seen within the gallbladder. No definite evidence of metastatic or recurrent disease. Chromogranin A, serotonin, and urine 5-HIAA from 3/7/2019 were all normal. She will see palliative care 4/23/2019 and I am making a referral to GI in [place] for assistance with the diffuse colitis. Patient wants GI referral closer to her home.    -7/12/2019 office visit:-5/23/2019 CT abdomen pelvis [facility]  showed postoperative changes.  Chromogranin A was normal with serum serotonin normal and CMP unremarkable with normal CBC.  Had seen Dr. Hunter since last we saw her for her rectal pain which is due to hemorrhoids that he is sending her to a rectal surgeon to get that removed.  As he was arranging that I did not interfere.  From our standpoint she will get the 24-hour urine 5-HIAA results she dropped off today from the computer and will see us back in 6 months with repeat testing of serum and urine and CAT scans prior to return.    -3/10/2020 colonoscopy Dr. Lomeli showed diverticulosis.  Pathology showed no colitis.    -7/22/2020 CT abdomen [facility] showed diffuse wall thickening of the colon with possible low-grade colitis.  No free fluid.  Postsurgical appendectomy and ileocolic resection changes.  Periumbilical hernia with incarcerated small bowel without evidence of strangulation or obstruction.  24-hour urine 5-HIAA normal 2.2.  CMP unremarkable save for GFR 44.4 with creatinine 1.21.  Normal liver enzymes.  CBC normal.  They did not draw the serotonin or the chromogranin A as ordered.       Low grade mucinous neoplasm of appendix   11/20/2018 Initial Diagnosis    Low grade mucinous neoplasm of appendix     3/14/2019 Imaging    CT of abdomen and pelvis IMPRESSION: Diffuse abnormal wall thickening identified of the colon. Findings suggestive of a diffuse colitis.. Several stones seen within the gallbladder. No definite evidence of metastatic or recurrent disease.      5/23/2019 Imaging    CT abdomen pelvis [facility] showed postoperative changes.  Chromogranin A was normal with serum serotonin normal and CMP unremarkable with normal CBC.     7/22/2020 Imaging    -3/10/2020 colonoscopy Dr. Lomeli showed diverticulosis.  Pathology showed no colitis.  -7/22/2020 CT abdomen pelvis [facility] showed diffuse wall thickening of the colon with possible low-grade colitis.  No free  fluid.  Postsurgical appendectomy and ileocolic resection changes.  Periumbilical hernia with incarcerated small bowel without evidence of strangulation or obstruction.  24-hour urine 5-HIAA normal 2.2.  CMP unremarkable save for GFR 44.4 with creatinine 1.21.  Normal liver enzymes.  CBC normal.  They did not draw the serotonin or the chromogranin A as ordered.         HISTORY OF PRESENT ILLNESS:  The patient is a 75 y.o. 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  She had umbilical hernia repair this summer at the [facility].  She tolerated the surgery well.  She had a colonoscopy this past summer at the [facility] with no complications.  She has received her first Covid vaccination and is due for the second dose .      Past Medical History:   Diagnosis Date   • Cancer (CMS/HCC) Appendix   • Colon polyp    • Diverticulitis of colon    • Fatty liver    • Hernia    • Hyperlipidemia    • Hypertension    • Irritable bowel syndrome      Past Surgical History:   Procedure Laterality Date   • ABDOMINAL SURGERY     • APPENDECTOMY     •  SECTION     • COLONOSCOPY     • HERNIA REPAIR     • OOPHORECTOMY Right        Allergies   Allergen Reactions   • Cefuroxime Rash   • Sulfa Antibiotics Rash       Family History and Social History reviewed and changed as necessary      REVIEW OF SYSTEM:   Review of Systems   Constitutional: Negative for appetite change, chills, diaphoresis, fatigue, fever and unexpected weight change.   HENT:   Negative for mouth sores, sore throat and trouble swallowing.    Eyes: Negative for icterus.   Respiratory: Negative for cough, hemoptysis and shortness of breath.    Cardiovascular: Negative for chest pain, leg swelling and palpitations.   Gastrointestinal: Negative for abdominal distention, abdominal pain, blood in stool, constipation, diarrhea, nausea and  "vomiting.   Endocrine: Negative for hot flashes.   Genitourinary: Negative for bladder incontinence, difficulty urinating, dysuria, frequency and hematuria.    Musculoskeletal: Negative for gait problem, neck pain and neck stiffness.   Skin: Negative for rash.   Neurological: Negative for dizziness, gait problem, headaches, light-headedness and numbness.   Hematological: Negative for adenopathy. Does not bruise/bleed easily.   Psychiatric/Behavioral: Negative for depression. The patient is not nervous/anxious.    All other systems reviewed and are negative.       PHYSICAL EXAM    Vitals:    01/28/21 1233   Weight: 61.7 kg (136 lb)   Height: 157.5 cm (62\")     Vitals:    01/28/21 1233   PainSc: 0-No pain        Phone visit      Lab Results   Component Value Date    HGB 12.7 06/18/2019    HCT 36.9 06/18/2019    MCV 91.2 06/18/2019     06/18/2019    WBC 9.00 06/18/2019    NEUTROABS 5.40 06/18/2019    LYMPHSABS 3.10 06/18/2019    MONOSABS 0.50 06/18/2019    EOSABS 0.35 (H) 11/27/2018    BASOSABS 0.03 11/27/2018       Lab Results   Component Value Date    GLUCOSE 87 06/18/2019    BUN 22 06/18/2019    CREATININE 0.78 06/18/2019     06/18/2019    K 4.8 06/18/2019     06/18/2019    CO2 24.0 06/18/2019    CALCIUM 9.8 06/18/2019    PROTEINTOT 7.7 06/18/2019    ALBUMIN 4.60 06/18/2019    BILITOT 1.2 06/18/2019    ALKPHOS 58 06/18/2019    AST 22 06/18/2019    ALT 17 06/18/2019                   ASSESSMENT & PLAN:    1. 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 Incarcerated hernia    Discussion: CT of the abdomen and pelvis with contrast from 1/14/2021 showed no evidence for recurrent or metastatic malignancy.  No acute abnormality.  Chronic findings as above.  Her labs from January 14, 2021 revealed a normal CBC.  Her BUN was slightly elevated at 19 and creatinine 1.37 total bilirubin " 1.20 AST 34 ALT 38 alk phos 57 chromogranin A was normal and serotonin level was normal as well.  She is doing well with no clinical symptoms of recurrence at this time.  We will plan on seeing her back in 6 months for follow-up evaluation.  After that we can see her back yearly.  We will plan on no further labs or scans except if symptoms arise.        This visit has been rescheduled as a phone visit to comply with patient safety concerns in accordance with CDC recommendations. Total time of discussion was 16 minutes.        IVAN Wu

## 2021-07-14 ENCOUNTER — OFFICE VISIT (OUTPATIENT)
Dept: ONCOLOGY | Facility: CLINIC | Age: 76
End: 2021-07-14

## 2021-07-14 VITALS
OXYGEN SATURATION: 95 % | DIASTOLIC BLOOD PRESSURE: 58 MMHG | TEMPERATURE: 97.8 F | HEART RATE: 70 BPM | HEIGHT: 62 IN | WEIGHT: 141 LBS | RESPIRATION RATE: 16 BRPM | BODY MASS INDEX: 25.95 KG/M2 | SYSTOLIC BLOOD PRESSURE: 131 MMHG

## 2021-07-14 DIAGNOSIS — C7A.020 MALIGNANT CARCINOID TUMOR OF APPENDIX (HCC): Primary | ICD-10-CM

## 2021-07-14 DIAGNOSIS — D37.3 LOW GRADE MUCINOUS NEOPLASM OF APPENDIX: ICD-10-CM

## 2021-07-14 PROCEDURE — 99213 OFFICE O/P EST LOW 20 MIN: CPT | Performed by: NURSE PRACTITIONER

## 2021-07-14 RX ORDER — ATORVASTATIN CALCIUM 10 MG/1
10 TABLET, FILM COATED ORAL
COMMUNITY
Start: 2021-05-10

## 2021-07-14 RX ORDER — FLUTICASONE PROPIONATE 50 MCG
SPRAY, SUSPENSION (ML) NASAL
COMMUNITY
Start: 2021-06-24

## 2021-07-14 RX ORDER — AMITRIPTYLINE HYDROCHLORIDE 25 MG/1
25 TABLET, FILM COATED ORAL
COMMUNITY
Start: 2021-04-07

## 2021-07-14 RX ORDER — AMLODIPINE BESYLATE 2.5 MG/1
2.5 TABLET ORAL DAILY
COMMUNITY
Start: 2021-07-12

## 2021-07-14 NOTE — PROGRESS NOTES
You have chosen to receive care through a telephone visit. Do you consent to use a telephone visit for your medical care today? 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  Laparoscopic resection of appendix showed 4 cm low grade appendiceal mucinous neoplasm well-differentiated with invasion into the muscularis propria into the subserosa focally extending to the serosal surface making this a pathological T4.  Margins were all negative.  No lymphovascular invasion.  Nodes not sampled.  In the same specimen at the base of the appendix was a 5 mm grade 1 well-differentiated carcinoid with the base of the appendix margin positive.  No perineural or lymph vascular invasion.  Subsequently presented to Dr. Dante Rodriguez and then to our multidisciplinary complex GI conference.  The consensus was to scan her thoroughly preferably with a gallium 68 Dotetate scan if possible and then do a right hemicolectomy and then consider adjuvant therapy based on the pathologic staging at that junction.  I saw for the first time on 11/20/18.  Low-grade mucinous neoplasms of the appendix with the potential of this having perforated does have a high risk of peritoneal spread and adjuvant chemotherapy as of dubious benefit.  Appendiceal carcinoids generally are coincidentally found an rarely spread to the liver.  Nonetheless I would like to get gallium-68 Dotetate PET and we'll check her chromogranin A, serotonin, and 24-hour urine 5-HIAA.  Assuming that's negative then I would proceed with a right hemicolectomy given the positive margin  at the base of the appendectomy and I would make adjuvant decisions based on what Dr. Rodriguez found intraoperatively and on pathologic staging.  I will also would add parenthetically that low-grade mucinous carcinomas are generally chemotherapy insensitive and she may prefer to have her surgery done in a place where were they to stumble upon peritoneal involvement of this process that she could do Hipec.  Her daughter lives in [place] and they have the capability at [facility].  With reference to the carcinoid component of this, generally the 5-FU based adjuvant chemotherapy we would give for colon cancer is not helpful for that either but would usually involve tyrosine kinase inhibitors or Temodar and or Avastin but that is not necessarily in an adjuvant setting.  Hence, based on the potential complexity of surgery she was subsequently sent to Dr. Hunter at [facility] who has familiarity with Hipec.    -11/27/2018 PET negative    -12/5/2018 gallium-68 Dotetate PET negative    -12/17/2018 colonoscopy showed hyperplastic polyp 12 mm in the sigmoid colon    -12/18/2018 robotic right hemicolectomy with small bowel resection by Dr. Hunter was performed.  Pathology showed no residual goblet cell carcinoid.  Margins negative for malignancy and no disease in 15 lymph nodes sampled.  Perioperative CT scans showed no distant metastases according to Dr. Hunter's note.    -2/12/2019: Followed up with Dr. Hunter.  He recommended follow-up surveillance with me and to see if survivorship at .     -3/7/2019 office visit: Saw me for the first time postoperatively.  Reviewed the above data with the patient.  While with the goblet cell carcinoid there is a higher risk of recurrence, there is no adjuvant data to suggest treatment at this junction.  She is now nearly 3 months out postoperatively. There is no indication for postoperative treatment. NCCN guidelines suggests biochemical markers and scanning only as clinically indicated  with tumors less than 2 cm and this was a 0.5 mm tumor.  For the low-grade mucinous neoplasm of the appendix, no routine serologic or scanning is recommended in the absence of symptoms.  There was no hint of peritoneal involvement according to Dr. Hunter.  Nonetheless, she continues to lose weight with a poor appetite and there is not been a postoperative scan done yet so I will established at baseline now and get her to my nutritionists to work with her diet and have her see my nurse practitioner back in a few weeks to make sure her weight has stabilized and that the scan showed no sinister evidence of metastasis.  The weight loss is almost assuredly decreased intake trying to deal with her postoperative diarrhea.  Though I would not routinely do chromogranin A, serotonin, and urine 5-HIAA, with her diarrhea I will check that one time though I think that this is almost assuredly postoperative in nature.  If all of this is negative then I would just see her quarterly with my nurse practitioner to make sure she is feeling well and may be due a blood count and chemistry but beyond that would not do routine CAT scans and serum tumor marker testing in the absence of symptoms.  We will get her with palliative care to assist with the diarrhea and with our nutritionist to help with her weight loss which is due to decreased oral intake.    -3/21/2019 office visit: CT of abdomen and pelvis Impression: Diffuse abnormal wall thickening identified of the colon. Findings suggestive of a diffuse colitis.. Several stones seen within the gallbladder. No definite evidence of metastatic or recurrent disease. Chromogranin A, serotonin, and urine 5-HIAA from 3/7/2019 were all normal. She will see palliative care 4/23/2019 and I am making a referral to GI in [place] for assistance with the diffuse colitis. Patient wants GI referral closer to her home.    -7/12/2019 office visit:-5/23/2019 CT abdomen pelvis [facility]  showed postoperative changes.  Chromogranin A was normal with serum serotonin normal and CMP unremarkable with normal CBC.  Had seen Dr. Hunter since last we saw her for her rectal pain which is due to hemorrhoids that he is sending her to a rectal surgeon to get that removed.  As he was arranging that I did not interfere.  From our standpoint she will get the 24-hour urine 5-HIAA results she dropped off today from the computer and will see us back in 6 months with repeat testing of serum and urine and CAT scans prior to return.    -3/10/2020 colonoscopy Dr. Lomeli showed diverticulosis.  Pathology showed no colitis.    -7/22/2020 CT abdomen pelvis [facility] showed diffuse wall thickening of the colon with possible low-grade colitis.  No free fluid.  Postsurgical appendectomy and ileocolic resection changes.  Periumbilical hernia with incarcerated small bowel without evidence of strangulation or obstruction.  24-hour urine 5-HIAA normal 2.2.  CMP unremarkable save for GFR 44.4 with creatinine 1.21.  Normal liver enzymes.  CBC normal.  They did not draw the serotonin or the chromogranin A as ordered.       Low grade mucinous neoplasm of appendix   11/20/2018 Initial Diagnosis    Low grade mucinous neoplasm of appendix     3/14/2019 Imaging    CT of abdomen and pelvis IMPRESSION: Diffuse abnormal wall thickening identified of the colon. Findings suggestive of a diffuse colitis.. Several stones seen within the gallbladder. No definite evidence of metastatic or recurrent disease.      5/23/2019 Imaging    CT abdomen pelvis [facility] showed postoperative changes.  Chromogranin A was normal with serum serotonin normal and CMP unremarkable with normal CBC.     7/22/2020 Imaging    -3/10/2020 colonoscopy Dr. Lomeli showed diverticulosis.  Pathology showed no colitis.  -7/22/2020 CT abdomen pelvis [facility] showed diffuse wall thickening of the colon with possible low-grade colitis.  No free  fluid.  Postsurgical appendectomy and ileocolic resection changes.  Periumbilical hernia with incarcerated small bowel without evidence of strangulation or obstruction.  24-hour urine 5-HIAA normal 2.2.  CMP unremarkable save for GFR 44.4 with creatinine 1.21.  Normal liver enzymes.  CBC normal.  They did not draw the serotonin or the chromogranin A as ordered.     2021 Imaging    2021 CT of abdomen and pelvis [facility] showed no evidence for recurrent or metastatic malignancy.  No acute abnormality.  Chronic findings as above.  Chromogranin a and serotonin levels were normal.  CMP showed a slightly elevated BUN and creatinine and bilirubin.  Normal ALT, AST and alk phos.         HISTORY OF PRESENT ILLNESS:  The patient is a 75 y.o. 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  Overall she is doing well.  She still has occasional frequent loose stools she is taking Colestid which helps control bowel frequency.        Past Medical History:   Diagnosis Date   • Cancer (CMS/HCC) Appendix   • Colon polyp    • Diverticulitis of colon    • Fatty liver    • Hernia    • Hyperlipidemia    • Hypertension    • Irritable bowel syndrome      Past Surgical History:   Procedure Laterality Date   • ABDOMINAL SURGERY     • APPENDECTOMY     •  SECTION     • COLONOSCOPY     • HERNIA REPAIR     • OOPHORECTOMY Right        Allergies   Allergen Reactions   • Cefuroxime Rash   • Sulfa Antibiotics Rash       Family History and Social History reviewed and changed as necessary      REVIEW OF SYSTEM:   Review of Systems   Constitutional: Negative for appetite change, chills, diaphoresis, fatigue, fever and unexpected weight change.   HENT:   Negative for mouth sores, sore throat and trouble swallowing.    Eyes: Negative for icterus.   Respiratory: Negative for cough, hemoptysis and shortness of breath.   "  Cardiovascular: Negative for chest pain, leg swelling and palpitations.   Gastrointestinal: Negative for abdominal distention, abdominal pain, blood in stool, constipation, nausea and vomiting.   Endocrine: Negative for hot flashes.   Genitourinary: Negative for bladder incontinence, difficulty urinating, dysuria, frequency and hematuria.    Musculoskeletal: Negative for gait problem, neck pain and neck stiffness.   Skin: Negative for rash.   Neurological: Negative for dizziness, gait problem, headaches, light-headedness and numbness.   Hematological: Negative for adenopathy. Does not bruise/bleed easily.   Psychiatric/Behavioral: Negative for depression. The patient is not nervous/anxious.    All other systems reviewed and are negative.       PHYSICAL EXAM    Vitals:    07/14/21 1510   BP: 131/58   Pulse: 70   Resp: 16   Temp: 97.8 °F (36.6 °C)   SpO2: 95%   Weight: 64 kg (141 lb)   Height: 157.5 cm (62\")     Vitals:    07/14/21 1510   PainSc: 0-No pain        Constitutional: Appears well-developed and well-nourished. No distress.   ECOG: (0) Fully active, able to carry on all predisease performance without restriction  HENT:   Head: Normocephalic.   Mouth/Throat: Oropharynx is clear and moist.   Eyes: Conjunctivae are normal. Pupils are equal, round, and reactive to light. No scleral icterus.   Neck: Neck supple. No JVD present. No thyromegaly present.   Cardiovascular: Normal rate, regular rhythm and normal heart sounds.    Pulmonary/Chest: Breath sounds normal. No respiratory distress.   Abdominal: Soft. Exhibits no distension and no mass. There is no hepatosplenomegaly. There is no tenderness. There is no rebound and no guarding.   Musculoskeletal:Exhibits no edema, tenderness or deformity.   Neurological: Alert and oriented to person, place, and time. Exhibits normal muscle tone.   Skin: No ecchymosis, no petechiae and no rash noted. Not diaphoretic. No cyanosis. Nails show no clubbing.   Psychiatric: Normal " mood and affect.   Vitals reviewed.      Lab Results   Component Value Date    HGB 12.7 06/18/2019    HCT 36.9 06/18/2019    MCV 91.2 06/18/2019     06/18/2019    WBC 9.00 06/18/2019    NEUTROABS 5.40 06/18/2019    LYMPHSABS 3.10 06/18/2019    MONOSABS 0.50 06/18/2019    EOSABS 0.35 (H) 11/27/2018    BASOSABS 0.03 11/27/2018       Lab Results   Component Value Date    GLUCOSE 87 06/18/2019    BUN 22 06/18/2019    CREATININE 0.78 06/18/2019     06/18/2019    K 4.8 06/18/2019     06/18/2019    CO2 24.0 06/18/2019    CALCIUM 9.8 06/18/2019    PROTEINTOT 7.7 06/18/2019    ALBUMIN 4.60 06/18/2019    BILITOT 1.2 06/18/2019    ALKPHOS 58 06/18/2019    AST 22 06/18/2019    ALT 17 06/18/2019                   ASSESSMENT & PLAN:    1. 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 Incarcerated hernia    Discussion: Overall she is doing well with no clinical evidence of disease recurrence at this time.  We will plan on seeing her back in 1 year for follow-up evaluation.  We will plan on no further labs or scans except if symptoms arise.            I spent 24 minutes caring for Cony on this date of service. This time includes time spent by me in the following activities: preparing for the visit, obtaining and/or reviewing a separately obtained history, performing a medically appropriate examination and/or evaluation, counseling and educating the patient/family/caregiver and documenting information in the medical record            IVAN Wu

## 2022-07-12 ENCOUNTER — TELEPHONE (OUTPATIENT)
Dept: ONCOLOGY | Facility: CLINIC | Age: 77
End: 2022-07-12

## 2022-07-12 NOTE — TELEPHONE ENCOUNTER
Caller: Cony Hayden    Relationship: Self    Best call back number: [phone]    What was the call regarding: CONY CALLED TO SEE IF HER UPCOMING APPT FOR 07/14 COULD BE CHANGED TO A TELEPHONE VISIT.    Do you require a callback: YES

## 2022-07-13 NOTE — TELEPHONE ENCOUNTER
Spoke with patient she is aware that her appointment is now a my chart video visit and I sent her the link to set up her my chart.

## 2022-07-14 ENCOUNTER — TELEPHONE (OUTPATIENT)
Dept: ONCOLOGY | Facility: CLINIC | Age: 77
End: 2022-07-14

## 2022-07-14 ENCOUNTER — TELEMEDICINE (OUTPATIENT)
Dept: ONCOLOGY | Facility: CLINIC | Age: 77
End: 2022-07-14

## 2022-07-14 VITALS — BODY MASS INDEX: 25.97 KG/M2 | WEIGHT: 142 LBS

## 2022-07-14 DIAGNOSIS — C7A.020 MALIGNANT CARCINOID TUMOR OF APPENDIX: ICD-10-CM

## 2022-07-14 DIAGNOSIS — D37.3 LOW GRADE MUCINOUS NEOPLASM OF APPENDIX: Primary | Chronic | ICD-10-CM

## 2022-07-14 PROCEDURE — 99212 OFFICE O/P EST SF 10 MIN: CPT | Performed by: NURSE PRACTITIONER

## 2022-07-14 NOTE — PROGRESS NOTES
Attempted telehealth visit through [employer].  Patient unable to logon due to technical difficulties.  Proceeded with visit over the telephone.    You have chosen to receive care through a telephone visit. Do you consent to use a telephone visit for your medical care today? 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  Laparoscopic resection of appendix showed 4 cm low grade appendiceal mucinous neoplasm well-differentiated with invasion into the muscularis propria into the subserosa focally extending to the serosal surface making this a pathological T4.  Margins were all negative.  No lymphovascular invasion.  Nodes not sampled.  In the same specimen at the base of the appendix was a 5 mm grade 1 well-differentiated carcinoid with the base of the appendix margin positive.  No perineural or lymph vascular invasion.  Subsequently presented to Dr. Dante Rodriguez and then to our multidisciplinary complex GI conference.  The consensus was to scan her thoroughly preferably with a gallium 68 Dotetate scan if possible and then do a right hemicolectomy and then consider adjuvant therapy based on the pathologic staging at that junction.  I saw for the first time on 11/20/18.  Low-grade mucinous neoplasms of the appendix with the potential of this having perforated does have a high risk of peritoneal spread and adjuvant chemotherapy as of dubious benefit.  Appendiceal carcinoids generally are coincidentally found an rarely spread to the liver.  Nonetheless I would like to get gallium-68 Dotetate PET and we'll check her  chromogranin A, serotonin, and 24-hour urine 5-HIAA.  Assuming that's negative then I would proceed with a right hemicolectomy given the positive margin at the base of the appendectomy and I would make adjuvant decisions based on what Dr. Rodriguez found intraoperatively and on pathologic staging.  I will also would add parenthetically that low-grade mucinous carcinomas are generally chemotherapy insensitive and she may prefer to have her surgery done in a place where were they to stumble upon peritoneal involvement of this process that she could do Hipec.  Her daughter lives in [place] and they have the capability at [facility].  With reference to the carcinoid component of this, generally the 5-FU based adjuvant chemotherapy we would give for colon cancer is not helpful for that either but would usually involve tyrosine kinase inhibitors or Temodar and or Avastin but that is not necessarily in an adjuvant setting.  Hence, based on the potential complexity of surgery she was subsequently sent to Dr. Hunter at [facility] who has familiarity with Hipec.    -11/27/2018 PET negative    -12/5/2018 gallium-68 Dotetate PET negative    -12/17/2018 colonoscopy showed hyperplastic polyp 12 mm in the sigmoid colon    -12/18/2018 robotic right hemicolectomy with small bowel resection by Dr. Hunter was performed.  Pathology showed no residual goblet cell carcinoid.  Margins negative for malignancy and no disease in 15 lymph nodes sampled.  Perioperative CT scans showed no distant metastases according to Dr. Hunter's note.    -2/12/2019: Followed up with Dr. Hunter.  He recommended follow-up surveillance with me and to see if survivorship at .     -3/7/2019 office visit: Saw me for the first time postoperatively.  Reviewed the above data with the patient.  While with the goblet cell carcinoid there is a higher risk of recurrence, there is no adjuvant data to suggest treatment at this junction.  She is now nearly 3 months out  postoperatively. There is no indication for postoperative treatment. NCCN guidelines suggests biochemical markers and scanning only as clinically indicated with tumors less than 2 cm and this was a 0.5 mm tumor.  For the low-grade mucinous neoplasm of the appendix, no routine serologic or scanning is recommended in the absence of symptoms.  There was no hint of peritoneal involvement according to Dr. Hunter.  Nonetheless, she continues to lose weight with a poor appetite and there is not been a postoperative scan done yet so I will established at baseline now and get her to my nutritionists to work with her diet and have her see my nurse practitioner back in a few weeks to make sure her weight has stabilized and that the scan showed no sinister evidence of metastasis.  The weight loss is almost assuredly decreased intake trying to deal with her postoperative diarrhea.  Though I would not routinely do chromogranin A, serotonin, and urine 5-HIAA, with her diarrhea I will check that one time though I think that this is almost assuredly postoperative in nature.  If all of this is negative then I would just see her quarterly with my nurse practitioner to make sure she is feeling well and may be due a blood count and chemistry but beyond that would not do routine CAT scans and serum tumor marker testing in the absence of symptoms.  We will get her with palliative care to assist with the diarrhea and with our nutritionist to help with her weight loss which is due to decreased oral intake.    -3/21/2019 office visit: CT of abdomen and pelvis Impression: Diffuse abnormal wall thickening identified of the colon. Findings suggestive of a diffuse colitis.. Several stones seen within the gallbladder. No definite evidence of metastatic or recurrent disease. Chromogranin A, serotonin, and urine 5-HIAA from 3/7/2019 were all normal. She will see palliative care 4/23/2019 and I am making a referral to GI in [place] for assistance  with the diffuse colitis. Patient wants GI referral closer to her home.    -7/12/2019 office visit:-5/23/2019 CT abdomen pelvis [facility] showed postoperative changes.  Chromogranin A was normal with serum serotonin normal and CMP unremarkable with normal CBC.  Had seen Dr. Hunter since last we saw her for her rectal pain which is due to hemorrhoids that he is sending her to a rectal surgeon to get that removed.  As he was arranging that I did not interfere.  From our standpoint she will get the 24-hour urine 5-HIAA results she dropped off today from the computer and will see us back in 6 months with repeat testing of serum and urine and CAT scans prior to return.    -3/10/2020 colonoscopy Dr. Lomeli showed diverticulosis.  Pathology showed no colitis.    -7/22/2020 CT abdomen pelvis [facility] showed diffuse wall thickening of the colon with possible low-grade colitis.  No free fluid.  Postsurgical appendectomy and ileocolic resection changes.  Periumbilical hernia with incarcerated small bowel without evidence of strangulation or obstruction.  24-hour urine 5-HIAA normal 2.2.  CMP unremarkable save for GFR 44.4 with creatinine 1.21.  Normal liver enzymes.  CBC normal.  They did not draw the serotonin or the chromogranin A as ordered.       Low grade mucinous neoplasm of appendix   11/20/2018 Initial Diagnosis    Low grade mucinous neoplasm of appendix     3/14/2019 Imaging    CT of abdomen and pelvis IMPRESSION: Diffuse abnormal wall thickening identified of the colon. Findings suggestive of a diffuse colitis.. Several stones seen within the gallbladder. No definite evidence of metastatic or recurrent disease.      5/23/2019 Imaging    CT abdomen pelvis [facility] showed postoperative changes.  Chromogranin A was normal with serum serotonin normal and CMP unremarkable with normal CBC.     7/22/2020 Imaging    -3/10/2020 colonoscopy Dr. Lomeli showed diverticulosis.   Pathology showed no colitis.  -7/22/2020 CT abdomen pelvis [facility] showed diffuse wall thickening of the colon with possible low-grade colitis.  No free fluid.  Postsurgical appendectomy and ileocolic resection changes.  Periumbilical hernia with incarcerated small bowel without evidence of strangulation or obstruction.  24-hour urine 5-HIAA normal 2.2.  CMP unremarkable save for GFR 44.4 with creatinine 1.21.  Normal liver enzymes.  CBC normal.  They did not draw the serotonin or the chromogranin A as ordered.     1/14/2021 Imaging    January 14, 2021 CT of abdomen and pelvis [facility] showed no evidence for recurrent or metastatic malignancy.  No acute abnormality.  Chronic findings as above.  Chromogranin a and serotonin levels were normal.  CMP showed a slightly elevated BUN and creatinine and bilirubin.  Normal ALT, AST and alk phos.         HISTORY OF PRESENT ILLNESS:  The patient is a 76 y.o. 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  Over the past year she did have COVID.  She was only sick a few days and recovered completely.  She fell approximately 3 weeks ago and had a hairline fracture in her left arm.  She had an evaluation by her PCP following the fall which included an EKG, Holter monitor and labs.  No indication for why she lost consciousness leading to fall noted.    She denies any abdominal pain or distention.  She has occasional frequent soft stools.  She denies any nausea or vomiting.  She does have intermittent back pain that improves with exercises and ice.          Past Medical History:   Diagnosis Date   • Cancer (CMS/HCC) Appendix   • Colon polyp    • Diverticulitis of colon    • Fatty liver    • Hernia    • Hyperlipidemia    • Hypertension    • Irritable bowel syndrome      Past Surgical History:   Procedure Laterality Date   • ABDOMINAL SURGERY     • APPENDECTOMY     •   SECTION     • COLONOSCOPY     • HERNIA REPAIR     • OOPHORECTOMY Right        Allergies   Allergen Reactions   • Cefuroxime Rash   • Sulfa Antibiotics Rash       Family History and Social History reviewed and changed as necessary      REVIEW OF SYSTEM:   Review of Systems   Constitutional: Negative for appetite change, chills, diaphoresis, fatigue, fever and unexpected weight change.   HENT:   Negative for mouth sores, sore throat and trouble swallowing.    Eyes: Negative for icterus.   Respiratory: Negative for cough, hemoptysis and shortness of breath.    Cardiovascular: Negative for chest pain, leg swelling and palpitations.   Gastrointestinal: Negative for abdominal distention, abdominal pain, blood in stool, constipation, nausea and vomiting.   Endocrine: Negative for hot flashes.   Genitourinary: Negative for bladder incontinence, difficulty urinating, dysuria, frequency and hematuria.    Musculoskeletal: Negative for gait problem, neck pain and neck stiffness.   Skin: Negative for rash.   Neurological: Negative for dizziness, gait problem, headaches, light-headedness and numbness.   Hematological: Negative for adenopathy. Does not bruise/bleed easily.   Psychiatric/Behavioral: Negative for depression. The patient is not nervous/anxious.    All other systems reviewed and are negative.       PHYSICAL EXAM    Vitals:    22 1449   Weight: 64.4 kg (142 lb)     Vitals:    22 1449   PainSc: 0-No pain           Psychiatric: Normal mood and affect.         Lab Results   Component Value Date    HGB 12.7 2019    HCT 36.9 2019    MCV 91.2 2019     2019    WBC 9.00 2019    NEUTROABS 5.40 2019    LYMPHSABS 3.10 2019    MONOSABS 0.50 2019    EOSABS 0.35 (H) 2018    BASOSABS 0.03 2018       Lab Results   Component Value Date    GLUCOSE 87 2019    BUN 22 2019    CREATININE 0.78 2019     2019    K 4.8  06/18/2019     06/18/2019    CO2 24.0 06/18/2019    CALCIUM 9.8 06/18/2019    PROTEINTOT 7.7 06/18/2019    ALBUMIN 4.60 06/18/2019    BILITOT 1.2 06/18/2019    ALKPHOS 58 06/18/2019    AST 22 06/18/2019    ALT 17 06/18/2019                   ASSESSMENT & PLAN:    1. 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 Incarcerated hernia    Discussion: Clinically she is doing well with no evidence of disease recurrence at this time.  We will continue to follow her yearly.  We will plan on no further labs or scans except if symptoms arise.      hSu Camargo, APRN   07/14/2022

## 2022-07-14 NOTE — TELEPHONE ENCOUNTER
Caller: JT    Relationship to patient: SELF    Best call back number: [phone]    Patient is needing: TO KNOW IF SHE CAN DO A TELEPHONE VISIT. SHE CANNOT GET THE [email or web address] RULES SIGNED.

## 2024-01-25 ENCOUNTER — TELEPHONE (OUTPATIENT)
Dept: ONCOLOGY | Facility: CLINIC | Age: 79
End: 2024-01-25
Payer: MEDICARE

## 2024-01-25 NOTE — TELEPHONE ENCOUNTER
I called patient back and gave her the information requested.  She had her last colonoscopy in 2020 with Dr. Lomeli and her original surgery was 12/18/18.  I told her that we would send Liliya Camargo's note to Dr. Marquez so he would have that information.  Patient verbalized understanding.

## 2024-01-25 NOTE — TELEPHONE ENCOUNTER
Caller: Cony Hayden    Relationship: Self    Best call back number: [phone]    What is the best time to reach you: ANYTIME    Who are you requesting to speak with (clinical staff, provider,  specific staff member): SHABBIR LOVE       What was the call regarding:     NEEDING TO KNOW WHEN HAD HER LAST COLONOSCOPY AND   ALSO THE ORIGINAL DATE OF HER SURGERY WHERE PART OF COLON WAS REMOVED.    WAS AT PCP YESTERDAY AND THEY WERE ASKING FOR THIS INFORMATION.     WANTED TO KNOW IF INFORMATION WAS SENT TO PCP AS WELL?     Is it okay if the provider responds through [employer]: NO